# Patient Record
Sex: FEMALE | Race: WHITE | NOT HISPANIC OR LATINO | Employment: OTHER | URBAN - NONMETROPOLITAN AREA
[De-identification: names, ages, dates, MRNs, and addresses within clinical notes are randomized per-mention and may not be internally consistent; named-entity substitution may affect disease eponyms.]

---

## 2022-12-29 ENCOUNTER — HOSPITAL ENCOUNTER (OUTPATIENT)
Facility: HOSPITAL | Age: 77
Discharge: NURSING, REHAB OR LONG-TERM CARE | End: 2023-01-16
Attending: INTERNAL MEDICINE | Admitting: INTERNAL MEDICINE
Payer: COMMERCIAL

## 2022-12-29 LAB — GLUCOSE BLDC GLUCOMTR-MCNC: 175 MG/DL (ref 70–130)

## 2022-12-29 PROCEDURE — 63710000001 PREDNISONE PER 1 MG: Performed by: INTERNAL MEDICINE

## 2022-12-29 PROCEDURE — 82962 GLUCOSE BLOOD TEST: CPT

## 2022-12-29 PROCEDURE — 63710000001 INSULIN LISPRO (HUMAN) PER 5 UNITS: Performed by: INTERNAL MEDICINE

## 2022-12-29 RX ORDER — METOPROLOL SUCCINATE 25 MG/1
12.5 TABLET, EXTENDED RELEASE ORAL
Status: DISCONTINUED | OUTPATIENT
Start: 2022-12-30 | End: 2022-12-30

## 2022-12-29 RX ORDER — GUAIFENESIN 600 MG/1
1200 TABLET, EXTENDED RELEASE ORAL EVERY 12 HOURS SCHEDULED
Status: DISCONTINUED | OUTPATIENT
Start: 2022-12-29 | End: 2023-01-16 | Stop reason: HOSPADM

## 2022-12-29 RX ORDER — ATORVASTATIN CALCIUM 10 MG/1
20 TABLET, FILM COATED ORAL NIGHTLY
Status: DISCONTINUED | OUTPATIENT
Start: 2022-12-29 | End: 2023-01-16 | Stop reason: HOSPADM

## 2022-12-29 RX ORDER — NICOTINE POLACRILEX 4 MG
15 LOZENGE BUCCAL
Status: DISCONTINUED | OUTPATIENT
Start: 2022-12-29 | End: 2023-01-16 | Stop reason: HOSPADM

## 2022-12-29 RX ORDER — INSULIN LISPRO 100 [IU]/ML
2-7 INJECTION, SOLUTION INTRAVENOUS; SUBCUTANEOUS
Status: DISCONTINUED | OUTPATIENT
Start: 2022-12-29 | End: 2023-01-06

## 2022-12-29 RX ORDER — ONDANSETRON 2 MG/ML
4 INJECTION INTRAMUSCULAR; INTRAVENOUS EVERY 6 HOURS PRN
Status: DISCONTINUED | OUTPATIENT
Start: 2022-12-29 | End: 2023-01-16 | Stop reason: HOSPADM

## 2022-12-29 RX ORDER — ZOLPIDEM TARTRATE 5 MG/1
5 TABLET ORAL NIGHTLY
Status: DISCONTINUED | OUTPATIENT
Start: 2022-12-29 | End: 2023-01-04

## 2022-12-29 RX ORDER — SODIUM CHLORIDE 0.9 % (FLUSH) 0.9 %
10 SYRINGE (ML) INJECTION AS NEEDED
Status: DISCONTINUED | OUTPATIENT
Start: 2022-12-29 | End: 2023-01-16 | Stop reason: HOSPADM

## 2022-12-29 RX ORDER — LEVALBUTEROL INHALATION SOLUTION 1.25 MG/3ML
1.25 SOLUTION RESPIRATORY (INHALATION)
Status: DISCONTINUED | OUTPATIENT
Start: 2022-12-29 | End: 2022-12-30

## 2022-12-29 RX ORDER — LOSARTAN POTASSIUM 50 MG/1
100 TABLET ORAL
Status: DISCONTINUED | OUTPATIENT
Start: 2022-12-30 | End: 2023-01-16 | Stop reason: HOSPADM

## 2022-12-29 RX ORDER — ONDANSETRON 4 MG/1
4 TABLET, FILM COATED ORAL EVERY 6 HOURS PRN
Status: DISCONTINUED | OUTPATIENT
Start: 2022-12-29 | End: 2023-01-16 | Stop reason: HOSPADM

## 2022-12-29 RX ORDER — FLECAINIDE ACETATE 100 MG/1
100 TABLET ORAL EVERY 12 HOURS SCHEDULED
Status: DISCONTINUED | OUTPATIENT
Start: 2022-12-29 | End: 2023-01-16 | Stop reason: HOSPADM

## 2022-12-29 RX ORDER — DEXTROSE MONOHYDRATE 25 G/50ML
25 INJECTION, SOLUTION INTRAVENOUS
Status: DISCONTINUED | OUTPATIENT
Start: 2022-12-29 | End: 2023-01-16 | Stop reason: HOSPADM

## 2022-12-29 RX ORDER — PANTOPRAZOLE SODIUM 40 MG/1
40 TABLET, DELAYED RELEASE ORAL EVERY MORNING
Status: DISCONTINUED | OUTPATIENT
Start: 2022-12-30 | End: 2022-12-29

## 2022-12-29 RX ORDER — HYDRALAZINE HYDROCHLORIDE 25 MG/1
25 TABLET, FILM COATED ORAL EVERY 8 HOURS SCHEDULED
Status: DISCONTINUED | OUTPATIENT
Start: 2022-12-29 | End: 2023-01-09

## 2022-12-29 RX ORDER — ACETAMINOPHEN 650 MG/1
650 SUPPOSITORY RECTAL EVERY 4 HOURS PRN
Status: DISCONTINUED | OUTPATIENT
Start: 2022-12-29 | End: 2023-01-16 | Stop reason: HOSPADM

## 2022-12-29 RX ORDER — SODIUM CHLORIDE 0.9 % (FLUSH) 0.9 %
10 SYRINGE (ML) INJECTION EVERY 12 HOURS SCHEDULED
Status: DISCONTINUED | OUTPATIENT
Start: 2022-12-29 | End: 2023-01-16 | Stop reason: HOSPADM

## 2022-12-29 RX ORDER — ACETYLCYSTEINE 200 MG/ML
3 SOLUTION ORAL; RESPIRATORY (INHALATION)
Status: DISCONTINUED | OUTPATIENT
Start: 2022-12-29 | End: 2022-12-30

## 2022-12-29 RX ORDER — PREDNISONE 20 MG/1
20 TABLET ORAL 2 TIMES DAILY WITH MEALS
Status: DISCONTINUED | OUTPATIENT
Start: 2022-12-29 | End: 2022-12-31

## 2022-12-29 RX ORDER — MONTELUKAST SODIUM 10 MG/1
10 TABLET ORAL NIGHTLY
Status: DISCONTINUED | OUTPATIENT
Start: 2022-12-29 | End: 2023-01-16 | Stop reason: HOSPADM

## 2022-12-29 RX ORDER — ALPRAZOLAM 0.5 MG/1
0.5 TABLET ORAL 2 TIMES DAILY PRN
Status: DISPENSED | OUTPATIENT
Start: 2022-12-29 | End: 2023-01-05

## 2022-12-29 RX ORDER — TORSEMIDE 20 MG/1
20 TABLET ORAL DAILY
Status: DISCONTINUED | OUTPATIENT
Start: 2022-12-30 | End: 2023-01-16 | Stop reason: HOSPADM

## 2022-12-29 RX ORDER — PANTOPRAZOLE SODIUM 40 MG/1
40 TABLET, DELAYED RELEASE ORAL NIGHTLY
Status: DISCONTINUED | OUTPATIENT
Start: 2022-12-29 | End: 2022-12-30

## 2022-12-29 RX ORDER — ACETAMINOPHEN 325 MG/1
650 TABLET ORAL EVERY 4 HOURS PRN
Status: DISCONTINUED | OUTPATIENT
Start: 2022-12-29 | End: 2023-01-16 | Stop reason: HOSPADM

## 2022-12-29 RX ORDER — SCOLOPAMINE TRANSDERMAL SYSTEM 1 MG/1
1 PATCH, EXTENDED RELEASE TRANSDERMAL
Status: DISCONTINUED | OUTPATIENT
Start: 2022-12-31 | End: 2023-01-02

## 2022-12-29 RX ORDER — DOCUSATE SODIUM 100 MG/1
100 CAPSULE, LIQUID FILLED ORAL 2 TIMES DAILY PRN
Status: DISCONTINUED | OUTPATIENT
Start: 2022-12-29 | End: 2023-01-16 | Stop reason: HOSPADM

## 2022-12-29 RX ORDER — POTASSIUM CHLORIDE 750 MG/1
20 CAPSULE, EXTENDED RELEASE ORAL DAILY
Status: DISCONTINUED | OUTPATIENT
Start: 2022-12-30 | End: 2023-01-16 | Stop reason: HOSPADM

## 2022-12-29 NOTE — PROGRESS NOTES
"LTACH Fall Assessment Note    Keisha Resendiz is a 77 y.o.female  [Ht: 162.6 cm (64\"); Wt: 98 kg (216 lb 0.8 oz)] admitted 12/29/2022  3:21 PM.    Current medications associated with an increased risk for fall include:  - Alprazolam  - Flecainide  - Hydralazine  - Tussionex  - Insulin lispro  - Losartan  - Metoprolol   - Scopolamine  - Torsemide   - Zolpidem    Tushar Carrizales, PharmD  12/29/2217:08 CST       "

## 2022-12-30 LAB
ALBUMIN SERPL-MCNC: 3.2 G/DL (ref 3.5–5.2)
ALBUMIN/GLOB SERPL: 1.1 G/DL
ALP SERPL-CCNC: 100 U/L (ref 39–117)
ALT SERPL W P-5'-P-CCNC: 22 U/L (ref 1–33)
ANION GAP SERPL CALCULATED.3IONS-SCNC: 10 MMOL/L (ref 5–15)
AST SERPL-CCNC: 32 U/L (ref 1–32)
BASOPHILS # BLD AUTO: 0.05 10*3/MM3 (ref 0–0.2)
BASOPHILS NFR BLD AUTO: 0.4 % (ref 0–1.5)
BILIRUB SERPL-MCNC: 1 MG/DL (ref 0–1.2)
BUN SERPL-MCNC: 43 MG/DL (ref 8–23)
BUN/CREAT SERPL: 69.4 (ref 7–25)
CALCIUM SPEC-SCNC: 9.1 MG/DL (ref 8.6–10.5)
CHLORIDE SERPL-SCNC: 96 MMOL/L (ref 98–107)
CO2 SERPL-SCNC: 34 MMOL/L (ref 22–29)
CREAT SERPL-MCNC: 0.62 MG/DL (ref 0.57–1)
DEPRECATED RDW RBC AUTO: 53.7 FL (ref 37–54)
EGFRCR SERPLBLD CKD-EPI 2021: 91.9 ML/MIN/1.73
EOSINOPHIL # BLD AUTO: 0.02 10*3/MM3 (ref 0–0.4)
EOSINOPHIL NFR BLD AUTO: 0.2 % (ref 0.3–6.2)
ERYTHROCYTE [DISTWIDTH] IN BLOOD BY AUTOMATED COUNT: 16.2 % (ref 12.3–15.4)
GLOBULIN UR ELPH-MCNC: 3 GM/DL
GLUCOSE BLDC GLUCOMTR-MCNC: 165 MG/DL (ref 70–130)
GLUCOSE BLDC GLUCOMTR-MCNC: 175 MG/DL (ref 70–130)
GLUCOSE BLDC GLUCOMTR-MCNC: 184 MG/DL (ref 70–130)
GLUCOSE BLDC GLUCOMTR-MCNC: 82 MG/DL (ref 70–130)
GLUCOSE SERPL-MCNC: 130 MG/DL (ref 65–99)
HCT VFR BLD AUTO: 47.7 % (ref 34–46.6)
HGB BLD-MCNC: 14.8 G/DL (ref 12–15.9)
IMM GRANULOCYTES # BLD AUTO: 0.17 10*3/MM3 (ref 0–0.05)
IMM GRANULOCYTES NFR BLD AUTO: 1.4 % (ref 0–0.5)
LYMPHOCYTES # BLD AUTO: 1.88 10*3/MM3 (ref 0.7–3.1)
LYMPHOCYTES NFR BLD AUTO: 15 % (ref 19.6–45.3)
MCH RBC QN AUTO: 27.9 PG (ref 26.6–33)
MCHC RBC AUTO-ENTMCNC: 31 G/DL (ref 31.5–35.7)
MCV RBC AUTO: 89.8 FL (ref 79–97)
MONOCYTES # BLD AUTO: 0.87 10*3/MM3 (ref 0.1–0.9)
MONOCYTES NFR BLD AUTO: 6.9 % (ref 5–12)
NEUTROPHILS NFR BLD AUTO: 76.1 % (ref 42.7–76)
NEUTROPHILS NFR BLD AUTO: 9.54 10*3/MM3 (ref 1.7–7)
NRBC BLD AUTO-RTO: 0 /100 WBC (ref 0–0.2)
PLATELET # BLD AUTO: 245 10*3/MM3 (ref 140–450)
PMV BLD AUTO: 10.6 FL (ref 6–12)
POTASSIUM SERPL-SCNC: 4 MMOL/L (ref 3.5–5.2)
PREALB SERPL-MCNC: 31.9 MG/DL (ref 20–40)
PROT SERPL-MCNC: 6.2 G/DL (ref 6–8.5)
RBC # BLD AUTO: 5.31 10*6/MM3 (ref 3.77–5.28)
SODIUM SERPL-SCNC: 140 MMOL/L (ref 136–145)
WBC NRBC COR # BLD: 12.53 10*3/MM3 (ref 3.4–10.8)

## 2022-12-30 PROCEDURE — 97166 OT EVAL MOD COMPLEX 45 MIN: CPT

## 2022-12-30 PROCEDURE — 97162 PT EVAL MOD COMPLEX 30 MIN: CPT | Performed by: PHYSICAL THERAPIST

## 2022-12-30 PROCEDURE — 92610 EVALUATE SWALLOWING FUNCTION: CPT

## 2022-12-30 PROCEDURE — 82962 GLUCOSE BLOOD TEST: CPT

## 2022-12-30 PROCEDURE — 63710000001 INSULIN LISPRO (HUMAN) PER 5 UNITS: Performed by: INTERNAL MEDICINE

## 2022-12-30 PROCEDURE — 80053 COMPREHEN METABOLIC PANEL: CPT | Performed by: INTERNAL MEDICINE

## 2022-12-30 PROCEDURE — 85025 COMPLETE CBC W/AUTO DIFF WBC: CPT | Performed by: INTERNAL MEDICINE

## 2022-12-30 PROCEDURE — 99222 1ST HOSP IP/OBS MODERATE 55: CPT | Performed by: INTERNAL MEDICINE

## 2022-12-30 PROCEDURE — 63710000001 PREDNISONE PER 1 MG: Performed by: INTERNAL MEDICINE

## 2022-12-30 PROCEDURE — 84134 ASSAY OF PREALBUMIN: CPT | Performed by: INTERNAL MEDICINE

## 2022-12-30 RX ORDER — METOPROLOL SUCCINATE 25 MG/1
25 TABLET, EXTENDED RELEASE ORAL
Status: DISCONTINUED | OUTPATIENT
Start: 2022-12-31 | End: 2023-01-16 | Stop reason: HOSPADM

## 2022-12-30 RX ORDER — NYSTATIN 100000 [USP'U]/G
POWDER TOPICAL EVERY 12 HOURS SCHEDULED
Status: DISCONTINUED | OUTPATIENT
Start: 2022-12-30 | End: 2023-01-16 | Stop reason: HOSPADM

## 2022-12-30 RX ORDER — LEVALBUTEROL INHALATION SOLUTION 1.25 MG/3ML
1.25 SOLUTION RESPIRATORY (INHALATION)
Status: DISCONTINUED | OUTPATIENT
Start: 2022-12-30 | End: 2022-12-30 | Stop reason: SDUPTHER

## 2022-12-30 RX ORDER — NYSTATIN 100000 [USP'U]/G
POWDER TOPICAL 2 TIMES DAILY PRN
Status: DISCONTINUED | OUTPATIENT
Start: 2022-12-30 | End: 2023-01-05

## 2022-12-30 RX ORDER — NYSTATIN 100000 [USP'U]/G
POWDER TOPICAL EVERY 8 HOURS SCHEDULED
Status: DISCONTINUED | OUTPATIENT
Start: 2022-12-30 | End: 2023-01-05 | Stop reason: SDUPTHER

## 2022-12-30 RX ORDER — PANTOPRAZOLE SODIUM 40 MG/1
40 TABLET, DELAYED RELEASE ORAL
Status: DISCONTINUED | OUTPATIENT
Start: 2022-12-30 | End: 2023-01-16 | Stop reason: HOSPADM

## 2022-12-30 RX ORDER — LEVALBUTEROL INHALATION SOLUTION 1.25 MG/3ML
1.25 SOLUTION RESPIRATORY (INHALATION)
Status: DISCONTINUED | OUTPATIENT
Start: 2022-12-30 | End: 2022-12-31

## 2022-12-30 RX ORDER — METOPROLOL TARTRATE 5 MG/5ML
5 INJECTION INTRAVENOUS EVERY 4 HOURS PRN
Status: DISCONTINUED | OUTPATIENT
Start: 2022-12-30 | End: 2023-01-16 | Stop reason: HOSPADM

## 2022-12-30 NOTE — CONSULTS
PULMONARY AND CRITICAL CARE CONSULT - [facility]    Keisha Resendiz   MR# [id number]  Acct# [id number]  12/30/2022   09:35 CST    Referring Provider: Jose Alfredo Mcbride MD    Chief Complaint: acute on chronic respiratory failure     HPI: We are consulted by Jose Alfredo Mcbride MD to see this 77 y.o. female born on 1945.  Patient complains of dyspnea in the chest for 3 weeks. Severity: severe.  Aggravating factors: walking.   Alleviating factors: medication(s) (albuterol) Associated symptoms: fatigue. Sputum is clear.  Patient currently is on oxygen at 4 L/min per nasal cannula.. Respiratory history: COPD  Ms. Resendiz transferred from [facility] after being treated for CHF and pneumonia. BNP 2500 on admission. She has a history of copd, severe tracheomalacia, pulmonary fibrosis and pulmonary hypertension.  She underwent bronchoscopy while at outlying facility with removal of mucus plugging. BAL grew some type of candida and she completed Eraxis. She completed a 7 day course of cefepime. She is seen awake and alert sitting up in bed. Oxygen saturation stable on 5l nasal cannula. She complains of severe fatigue. Afebrile.     Past Medical History   has no past medical history on file.   has no past surgical history on file.  Allergies   Allergen Reactions   • Clindamycin/Lincomycin Unknown - High Severity   • Keflex [Cephalexin] Unknown - High Severity   • Penicillins Unknown - High Severity   • Sulfa Antibiotics Unknown - High Severity     Medications  apixaban, 5 mg, Oral, Q12H  atorvastatin, 20 mg, Oral, Nightly  flecainide, 100 mg, Oral, Q12H  guaiFENesin, 1,200 mg, Oral, Q12H  hydrALAZINE, 25 mg, Oral, Q8H  insulin lispro, 2-7 Units, Subcutaneous, 4x Daily AC & at Bedtime  ipratropium, 0.5 mg, Nebulization, TID - RT  levalbuterol, 1.25 mg, Nebulization, TID - RT  losartan, 100 mg, Oral, Q24H  metoprolol succinate XL, 12.5 mg, Oral, Q24H  montelukast, 10 mg, Oral,  Nightly  nystatin, , Topical, Q8H  pantoprazole, 40 mg, Oral, BID AC  potassium chloride, 20 mEq, Oral, Daily  predniSONE, 20 mg, Oral, BID With Meals  [START ON 12/31/2022] Scopolamine, 1 patch, Transdermal, Q72H  sodium chloride, 10 mL, Intravenous, Q12H  torsemide, 20 mg, Oral, Daily  zolpidem, 5 mg, Oral, Nightly         Social History     Family History  family history is not on file.  Review of Systems:  Review of Systems   Constitutional: Positive for fatigue. Negative for fever.   HENT: Negative for congestion.    Respiratory: Positive for cough and shortness of breath.    Cardiovascular: Negative for chest pain.   Gastrointestinal: Negative for diarrhea and nausea.   Genitourinary: Negative for difficulty urinating.   Musculoskeletal: Positive for arthralgias.   Skin: Negative for rash and wound.   Neurological: Positive for weakness.   Psychiatric/Behavioral: The patient is not nervous/anxious.      Physical Exam:  Vital signs: T:97.6   BP:126/62   P:77   R:16   Sat:93  Physical Exam  Constitutional:       General: She is not in acute distress.     Interventions: Nasal cannula in place.   HENT:      Head: Normocephalic.      Nose: Nose normal.      Mouth/Throat:      Mouth: Mucous membranes are moist.   Eyes:      General: No scleral icterus.  Cardiovascular:      Rate and Rhythm: Normal rate.   Pulmonary:      Effort: No respiratory distress.      Breath sounds: Decreased breath sounds present.   Abdominal:      General: There is no distension.   Genitourinary:     Comments: May   Musculoskeletal:      Right lower leg: Edema present.      Left lower leg: Edema present.   Skin:     Findings: Bruising present. No erythema.      Comments: Chronic venous stasis changes to ble    Neurological:      Mental Status: She is alert and oriented to person, place, and time.   Psychiatric:         Mood and Affect: Mood normal.         Behavior: Behavior is cooperative.         Electronically signed by Anastasia Doe  APRN, 2022, 09:49 CST      Physician Substantive Portion: Medical Decision Making    Results from last 7 days   Lab Units 22  0408   WBC 10*3/mm3 12.53*   HEMOGLOBIN g/dL 14.8   PLATELETS 10*3/mm3 245     Results from last 7 days   Lab Units 22  0408   SODIUM mmol/L 140   POTASSIUM mmol/L 4.0   CO2 mmol/L 34.0*   BUN mg/dL 43*   CREATININE mg/dL 0.62   GLUCOSE mg/dL 130*         No results found for: PROBNP  No results found for: BLOODCX, URINECX, WOUNDCX, MRSACX, RESPCX, STOOLCX    Recent radiology:   Imaging Results (Last 72 Hours)     ** No results found for the last 72 hours. **      cxr 10/2022 outside  Frontal chest. Heart appears mildly enlarged. Peripheral reticular   opacities are seen which may be related to progression of interstitial   changes and scarring although atypical pneumonia would be in the   differential. Left basilar opacities favoring atelectasis or scarring.   Otherwise no significant consolidation seen. No sizable effusion and   no pneumothorax seen.  Exam End: 10/19/22 21:23       My radiograph interpretation/independent review of imaging: images not available  Other test results (not lab or imaging):  none available  Independent review of ekg: telem: sr  Problem List as identified by Epic (may contain historical, inactive problems)  There is no problem list on file for this patient.    Pulmonary Assessment:  New problem (to me), with additional workup planned: copd exacerbation with acute hypoxic resp failure  New problem (to me), no additional workup planned: CHF, treated  Other problems either stable, failing to improve or worsenin. Recent candida in resp tract    Recommend/plan:   · Check cxr when available here  · Continue steroids, will begin tapering tomorrow if better  · Aerosol bronchodilators  · PT, mobilization  · Continue oxygen for sat 90, may need home oxygen  · Consider stopping scopolamine patch    This visit was performed by both a physician and an  Advanced Practice RN.  I personally evaluated and examined the patient.  I performed all aspects of the medical decision making as documented.  Electronically signed by Alejandro Batista MD, 12/30/2022, 11:17 CST

## 2022-12-30 NOTE — H&P
Reed Mcbride M.D.  KD Guillen          Internal Medicine History and Physical      Name: Keisha Resendiz  MRN: [id number]     Acct: [id number]  Room: 5/    Admit Date: 12/29/2022  PCP: Shannon Lilly PA-C    Chief Complaint:     Oxygen weaning and rehabilitation efforts    History Obtained From:     Patient and chart review    History of Present Illness:      Keisha Resendiz is a  77 y.o.  female who presents to our facility with a past medical history of Heart failure, depression, COPD with home O2, HTN, Hyperlipidemia, bronchiectasis, chronic interstitial lung disease, pulmonary hypertension, severe tracheomalacia and DVT with IVC filter placement. She was in her usual state of health when she became short of breath at home, she checked her pulse ox and her sat was in the low 60's. Home health arrived to recheck and advised she go to the ED. Upon arrival at the ED she mentioned she had been short of breath for a few days, had a productive cough, fever and weakness. She was placed on a venti mask in the ED and admitted to the ICU due to concerns she might need BiPAP. While cultures were pending she was treated empirically with cefepime and vancomycin. She was also diuresed with lasix and given typical pulmonary toilet ing. Vancomycin was later changed to Zyvox orally to decrease some fluid intake. CT of her chest showed numerous small PE's in which lovenox was started for.  Due to a positive MRSA nasal swab and history of MRSA pneumonia last September she was placed on linezolid. After the sensitivities were back she was changed to bactrim and tetracycline. A bronchoscopy was performed, large amounts of mucous plugging were removed. Airway inspection showed a large venous lake in the anterior segment of the left upper lobe but no other lesions. The bronchial wash grew Candida and the patient was placed on Erais secondary to interaction of fluconazole with patients flecainide.  All antibiotics were de-escalated and completed by 12/25/22.  Due to her continued need for oxygen weaning and rehabilitation efforts she was transferred to our facility.      Past Medical History:     No past medical history on file.     Past Surgical History:     No past surgical history on file.     Medications Prior to Admission:       Prior to Admission medications    Not on File        Allergies:       Clindamycin/lincomycin, Keflex [cephalexin], Penicillins, and Sulfa antibiotics    Social History:     Tobacco:    has no history on file for tobacco use.  Alcohol:      has no history on file for alcohol use.  Drug Use:  has no history on file for drug use.    Family History:     No family history on file.    Review of Systems:     Review of Systems   Constitutional: Positive for malaise/fatigue. Negative for chills, decreased appetite and fever.   HENT: Negative for congestion, ear pain, nosebleeds and sore throat.    Eyes: Negative for blurred vision, double vision and pain.   Cardiovascular: Positive for dyspnea on exertion and irregular heartbeat. Negative for chest pain, claudication and leg swelling.   Respiratory: Positive for shortness of breath. Negative for cough and hemoptysis.    Endocrine: Negative for cold intolerance, heat intolerance, polydipsia, polyphagia and polyuria.   Hematologic/Lymphatic: Negative for adenopathy and bleeding problem.   Skin: Negative for flushing, itching, nail changes and rash.   Musculoskeletal: Positive for falls and muscle weakness. Negative for arthritis and back pain.   Gastrointestinal: Negative for bloating, diarrhea, nausea and vomiting.   Genitourinary: Negative for dysuria, flank pain and hematuria.   Neurological: Positive for weakness. Negative for difficulty with concentration and excessive daytime sleepiness.   Psychiatric/Behavioral: Negative for altered mental status, depression, hallucinations, suicidal ideas and thoughts of violence.  "  Allergic/Immunologic: Negative for environmental allergies, HIV exposure, hives and persistent infections.       Code Status:    There are no questions and answers to display.       Physical Exam:     Vitals:  Ht 162.6 cm (64\")   Wt 98 kg (216 lb 0.8 oz)   BMI 37.09 kg/m²   T 97.6 HR 77 RR 16 /63 SPO2 93% on NC 6L   Physical Exam  Vitals and nursing note reviewed.   Constitutional:       Appearance: Normal appearance. She is obese.   HENT:      Head: Normocephalic and atraumatic.      Right Ear: External ear normal.      Left Ear: External ear normal.      Nose: Nose normal.      Mouth/Throat:      Mouth: Mucous membranes are moist.      Pharynx: Oropharynx is clear.   Eyes:      Extraocular Movements: Extraocular movements intact.      Conjunctiva/sclera: Conjunctivae normal.      Pupils: Pupils are equal, round, and reactive to light.   Cardiovascular:      Rate and Rhythm: Tachycardia present. Rhythm irregular.      Pulses: Normal pulses.      Heart sounds: Normal heart sounds.   Pulmonary:      Effort: Pulmonary effort is normal.      Breath sounds: Wheezing present.   Abdominal:      General: Bowel sounds are normal.      Palpations: Abdomen is soft.   Musculoskeletal:         General: Normal range of motion.      Cervical back: Normal range of motion.      Right lower leg: No edema.      Left lower leg: No edema.   Skin:     General: Skin is warm and dry.      Capillary Refill: Capillary refill takes 2 to 3 seconds.   Neurological:      General: No focal deficit present.      Mental Status: She is alert and oriented to person, place, and time. Mental status is at baseline.   Psychiatric:         Mood and Affect: Mood normal.         Behavior: Behavior normal.         Thought Content: Thought content normal.         Judgment: Judgment normal.               Data:     Lab Results (last 7 days)       Procedure Component Value Units Date/Time    Prealbumin [[id number]]  (Normal) Collected: 12/30/22 0408    " Specimen: Blood Updated: 12/30/22 1230     Prealbumin 31.9 mg/dL     POC Glucose Once [[id number]]  (Abnormal) Collected: 12/30/22 1108    Specimen: Blood Updated: 12/30/22 1131     Glucose 175 mg/dL      Comment: : sean Al ([facility]) CindyMeter ID: [id number]       POC Glucose Once [[id number]]  (Normal) Collected: 12/30/22 0715    Specimen: Blood Updated: 12/30/22 0727     Glucose 82 mg/dL      Comment: : jordan Rodriguez BrittanyMeter ID: [id number]       Comprehensive Metabolic Panel [[id number]]  (Abnormal) Collected: 12/30/22 0408    Specimen: Blood Updated: 12/30/22 0453     Glucose 130 mg/dL      BUN 43 mg/dL      Creatinine 0.62 mg/dL      Sodium 140 mmol/L      Potassium 4.0 mmol/L      Chloride 96 mmol/L      CO2 34.0 mmol/L      Calcium 9.1 mg/dL      Total Protein 6.2 g/dL      Albumin 3.2 g/dL      ALT (SGPT) 22 U/L      AST (SGOT) 32 U/L      Alkaline Phosphatase 100 U/L      Total Bilirubin 1.0 mg/dL      Globulin 3.0 gm/dL      A/G Ratio 1.1 g/dL      BUN/Creatinine Ratio 69.4     Anion Gap 10.0 mmol/L      eGFR 91.9 mL/min/1.73      Comment: National Kidney Foundation and American Society of Nephrology (ASN) Task Force recommended calculation based on the Chronic Kidney Disease Epidemiology Collaboration (CKD-EPI) equation refit without adjustment for race.       Narrative:      GFR Normal >60  Chronic Kidney Disease <60  Kidney Failure <15    The GFR formula is only valid for adults with stable renal function between ages 18 and 70.    CBC & Differential [[id number]]  (Abnormal) Collected: 12/30/22 0408    Specimen: Blood Updated: 12/30/22 0427    Narrative:      The following orders were created for panel order CBC & Differential.  Procedure                               Abnormality         Status                     ---------                               -----------         ------                     CBC Auto Differential[[id number]]        Abnormal            Final result                  Please view results for these tests on the individual orders.    CBC Auto Differential [[id number]]  (Abnormal) Collected: 12/30/22 0408    Specimen: Blood Updated: 12/30/22 0427     WBC 12.53 10*3/mm3      RBC 5.31 10*6/mm3      Hemoglobin 14.8 g/dL      Hematocrit 47.7 %      MCV 89.8 fL      MCH 27.9 pg      MCHC 31.0 g/dL      RDW 16.2 %      RDW-SD 53.7 fl      MPV 10.6 fL      Platelets 245 10*3/mm3      Neutrophil % 76.1 %      Lymphocyte % 15.0 %      Monocyte % 6.9 %      Eosinophil % 0.2 %      Basophil % 0.4 %      Immature Grans % 1.4 %      Neutrophils, Absolute 9.54 10*3/mm3      Lymphocytes, Absolute 1.88 10*3/mm3      Monocytes, Absolute 0.87 10*3/mm3      Eosinophils, Absolute 0.02 10*3/mm3      Basophils, Absolute 0.05 10*3/mm3      Immature Grans, Absolute 0.17 10*3/mm3      nRBC 0.0 /100 WBC     POC Glucose Once [[id number]]  (Abnormal) Collected: 12/29/22 1630    Specimen: Blood Updated: 12/29/22 1641     Glucose 175 mg/dL      Comment: : ashish Sumner JanieMeter ID: [id number]             No results found.      Assessment:     Primary Problem  <principal problem not specified>        * No active hospital problems. *    No past medical history on file.    Plan:     Acute hypoxemic respiratory failure  Pulmonary Emboli  Heart failure with preserved ejection fraction  Pulmonary fibrosis  Severe tracheomalacia  Afib with RVR  Hypertension  Acute exacerbation of COPD  Hyperglycemia    Continue current treatments. Oxygen weaning. Consult PT/OT/SLP for evaluation and treatment. Maintain patient safety. Glycemic control. Labs on Monday. Cardizem drip.       Electronically signed by KD Grey on 12/30/2022 at 12:43 CST     Copy sent to Dr. Lilly, Shannon Guillen PA-C  I have discussed the care of Keisha Resendiz, including pertinent history and exam findings, with the nurse practitioner.    I have seen and examined the patient and the key elements of all parts of the  encounter have been performed by me.  I agree with the assessment, plan and orders as documented by KD Grajeda, after I modified the exam findings and the plan of treatments and the final version is my approved version of the assessment.        Electronically signed by Jose Alfredo Mcbride MD on 1/4/2023 at 10:57 CST

## 2022-12-30 NOTE — CONSULTS
Adult Nutrition  Assessment/PES    Patient Name:  Keisha Resendiz  YOB: 1945  MRN: [id number]  Admit Date:  12/29/2022    Assessment Date:  12/30/2022   Reason for Assessment     Row Name 12/30/22 1050          Reason for Assessment    Reason For Assessment per organizational policy;other (see comments)  LTACH admission     Diagnosis cardiac disease;pulmonary disease                Nutrition/Diet History     Row Name 12/30/22 1051          Nutrition/Diet History    Typical Intake (Food/Fluid/EN/PN) Working with therapy. Menu provided. Admit wt 245lb; 111kg per admit paperwork. Last BM 12/29 per admission face sheet. Venous stasis BLEs and left side abdomen fold with nystatin treatment.                Labs/Tests/Procedures/Meds     Row Name 12/30/22 1052          Labs/Procedures/Meds    Lab Results Reviewed reviewed        Diagnostic Tests/Procedures    Diagnostic Test/Procedure Reviewed reviewed        Medications    Pertinent Medications Reviewed reviewed     Pertinent Medications Comments See MAR                Physical Findings     Row Name 12/30/22 1052          Physical Findings    Overall Physical Appearance BLE edema, BLE venous stasis, left abd. fold redness, BM 12/29, NC.                Estimated/Assessed Needs - Anthropometrics     Row Name 12/30/22 1052          Anthropometrics    Weight for Calculation 111 kg (244 lb 11.4 oz)        Estimated/Assessed Needs    Additional Documentation Fluid Requirements (Group);KCAL/KG (Group);Protein Requirements (Group)        KCAL/KG    KCAL/KG 14 Kcal/Kg (kcal)     14 Kcal/Kg (kcal) 1554        Protein Requirements    Weight Used For Protein Calculations 54.4 kg (120 lb)  IBW     Est Protein Requirement Amount (gms/kg) 1.4 gm protein     Estimated Protein Requirements (gms/day) 76.2        Fluid Requirements    Fluid Requirements (mL/day) 1633     Estimated Fluid Requirement Method other (see comments)  30mL/kg IBW     RDA Method (mL) 8368                 Nutrition Prescription Ordered     Row Name 12/30/22 1053          Nutrition Prescription PO    Current PO Diet Regular     Fluid Consistency Thin                Evaluation of Received Nutrient/Fluid Intake     Row Name 12/30/22 1053          Nutrient/Fluid Evaluation    Number of Days Evaluated Other (comment)  insufficient data; admission < 24 hours        Fluid Intake Evaluation    Oral Fluid (mL) --  insufficient data; admission < 24 hours        PO Evaluation    Number of Days PO Intake Evaluated Insufficient Data     % PO Intake insufficient data; admission < 24 hours                   Problem/Interventions:   Problem 1     Row Name 12/30/22 1053          Nutrition Diagnoses Problem 1    Problem 1 Nutrition Appropriate for Condition at this Time                      Intervention Goal     Row Name 12/30/22 1053          Intervention Goal    General Disease management/therapy;Meet nutritional needs for age/condition     PO Meet estimated needs;Tolerate PO     Weight Appropriate weight loss                Nutrition Intervention     Row Name 12/30/22 1053          Nutrition Intervention    RD/Tech Action Follow Tx progress;Care plan reviewd;Menu provided                Nutrition Prescription     Row Name 12/30/22 1053          Nutrition Prescription PO    PO Prescription Other (comment)  continue same protocol                Education/Evaluation     Row Name 12/30/22 1053          Education    Education No discharge needs identified at this time        Monitor/Evaluation    Monitor Per protocol                 Electronically signed by:  Juana Giles RD  12/30/22 10:54 CST

## 2022-12-31 LAB
GLUCOSE BLDC GLUCOMTR-MCNC: 110 MG/DL (ref 70–130)
GLUCOSE BLDC GLUCOMTR-MCNC: 172 MG/DL (ref 70–130)
GLUCOSE BLDC GLUCOMTR-MCNC: 99 MG/DL (ref 70–130)

## 2022-12-31 PROCEDURE — 82962 GLUCOSE BLOOD TEST: CPT

## 2022-12-31 PROCEDURE — 97110 THERAPEUTIC EXERCISES: CPT

## 2022-12-31 PROCEDURE — 97530 THERAPEUTIC ACTIVITIES: CPT

## 2022-12-31 PROCEDURE — 99232 SBSQ HOSP IP/OBS MODERATE 35: CPT | Performed by: INTERNAL MEDICINE

## 2022-12-31 PROCEDURE — 63710000001 PREDNISONE PER 1 MG: Performed by: NURSE PRACTITIONER

## 2022-12-31 PROCEDURE — 63710000001 LEVALBUTEROL PER 0.5 MG: Performed by: NURSE PRACTITIONER

## 2022-12-31 PROCEDURE — 63710000001 INSULIN LISPRO (HUMAN) PER 5 UNITS: Performed by: INTERNAL MEDICINE

## 2022-12-31 RX ORDER — LEVALBUTEROL INHALATION SOLUTION 0.63 MG/3ML
0.63 SOLUTION RESPIRATORY (INHALATION)
Status: DISCONTINUED | OUTPATIENT
Start: 2022-12-31 | End: 2023-01-03

## 2022-12-31 RX ORDER — PREDNISONE 20 MG/1
20 TABLET ORAL DAILY
Status: DISCONTINUED | OUTPATIENT
Start: 2022-12-31 | End: 2023-01-03

## 2022-12-31 RX ORDER — LEVALBUTEROL INHALATION SOLUTION 0.63 MG/3ML
0.63 SOLUTION RESPIRATORY (INHALATION)
Status: DISCONTINUED | OUTPATIENT
Start: 2022-12-31 | End: 2022-12-31 | Stop reason: SDUPTHER

## 2022-12-31 RX ORDER — PREDNISONE 20 MG/1
20 TABLET ORAL DAILY
Status: DISCONTINUED | OUTPATIENT
Start: 2023-01-01 | End: 2022-12-31

## 2022-12-31 NOTE — PROGRESS NOTES
PULMONARY AND CRITICAL CARE PROGRESS NOTE - [facility]    Patient: Keisha Resendiz    1945   Acct# [id number]  12/31/22   10:50 CST  Referring Provider: Jose Alfredo Mcbride MD  Chief Complaint: acute on chronic respiratory failure   Interval history: She is seen awake and alert resting in bed. She is feeling better today. Oxygen saturation stable on 3l nasal cannula. She reports her heart rate was elevated some overnight. Afebrile. No other issues reported overnight.   Review of Systems: Review of Systems   Constitutional: Positive for fever.   Respiratory: Positive for cough and shortness of breath.    Cardiovascular: Negative for chest pain.   Gastrointestinal: Negative for diarrhea and nausea.   Musculoskeletal: Negative for arthralgias.   Neurological: Positive for weakness.      Physical Exam:  Vital signs: T:97.8   BP:122/57   P:78   R:30   Sat:94  Physical Exam  Constitutional:       General: She is not in acute distress.     Appearance: She is obese.      Interventions: Nasal cannula in place.   HENT:      Head: Normocephalic.      Nose: Nose normal.      Mouth/Throat:      Mouth: Mucous membranes are moist.   Eyes:      General: No scleral icterus.  Cardiovascular:      Rate and Rhythm: Normal rate.   Pulmonary:      Effort: No respiratory distress.      Breath sounds: Rales present.   Abdominal:      General: There is no distension.   Genitourinary:     Comments: penn  Musculoskeletal:      Right lower leg: Edema present.      Left lower leg: Edema present.   Skin:     Findings: Bruising present.   Neurological:      Mental Status: She is alert and oriented to person, place, and time.   Psychiatric:         Mood and Affect: Mood normal.         Behavior: Behavior is cooperative.         Electronically signed by KD Gunter, 12/31/2022, 10:50 CST      Physician substantive portion: medical decision making    Laboratory Data:  Results from last 7 days   Lab Units  12/30/22  0408   WBC 10*3/mm3 12.53*   HEMOGLOBIN g/dL 14.8   PLATELETS 10*3/mm3 245     Results from last 7 days   Lab Units 12/30/22  0408   SODIUM mmol/L 140   POTASSIUM mmol/L 4.0   CO2 mmol/L 34.0*   BUN mg/dL 43*   CREATININE mg/dL 0.62         Pulmonary Assessment:    1. Acute resp failure with hypoxia  2. Copd  3. Tracheomalacia  4. Pulmonary fibrosis  5. Pulmonary hypertension  6. Weakness and deconditioning; pt reports nonambulatory currently    Recommend/plan:   · Continue RT  · Continue oxygen and titrate for sat 90  · PT mobilization      This visit was performed by both a physician and an Advanced Practice RN.  I personally evaluated and examined the patient.  I performed all aspects of the medical decision making as documented.  Electronically signed by Alejandro Batista MD, 12/31/2022, 13:12 CST

## 2022-12-31 NOTE — PROGRESS NOTES
[facility]  FABBY Sung APRN        Internal Medicine Progress Note    12/31/2022   09:12 CST    Name:  Keisha Resendiz  MRN:    [id number]     Acct:     [id number]   Room:  [address] [place] [address] Day: 0     Admit Date: 12/29/2022  3:21 PM  PCP: Shannon Lilly PA-C    Subjective:     C/C: Oxygen weaning and rehabilitation efforts    Interval History: Status: Improved. Pt resting in bed, no family at bedside. Pt tolerating O2 @6L. Complains of weakness.  Off Cardizem drip, HR in 70's. No new concerns this am.     Review of Systems   Constitutional: Negative for chills, decreased appetite and fever.   HENT: Negative for congestion, ear pain and sore throat.    Eyes: Negative for blurred vision, discharge and pain.   Cardiovascular: Positive for dyspnea on exertion and irregular heartbeat. Negative for chest pain and claudication.   Respiratory: Positive for shortness of breath. Negative for cough.    Endocrine: Negative for cold intolerance, heat intolerance, polydipsia, polyphagia and polyuria.   Skin: Negative for flushing, itching, nail changes and rash.   Musculoskeletal: Positive for falls and muscle weakness. Negative for arthritis and back pain.   Gastrointestinal: Negative for bloating, abdominal pain and diarrhea.   Genitourinary: Negative for bladder incontinence, dysuria and flank pain.   Neurological: Positive for weakness. Negative for difficulty with concentration, disturbances in coordination and headaches.   Psychiatric/Behavioral: Positive for altered mental status. Negative for depression, substance abuse, suicidal ideas and thoughts of violence.   Allergic/Immunologic: Negative for environmental allergies, HIV exposure, hives and persistent infections.         Medications:     Allergies:   Allergies   Allergen Reactions   • Clindamycin/Lincomycin Unknown - High Severity   • Keflex [Cephalexin] Unknown - High Severity   • Penicillins Unknown - High Severity   • Sulfa  Antibiotics Unknown - High Severity       Current Meds:   Current Facility-Administered Medications:   •  acetaminophen (TYLENOL) tablet 650 mg, 650 mg, Oral, Q4H PRN **OR** acetaminophen (TYLENOL) suppository 650 mg, 650 mg, Rectal, Q4H PRN, Jose Alfredo Mcbride MD  •  ALPRAZolam (XANAX) tablet 0.5 mg, 0.5 mg, Oral, BID PRN, Jose Alfredo Mcbride MD  •  apixaban (ELIQUIS) tablet 5 mg, 5 mg, Oral, Q12H, Jose Alfredo Mcbride MD  •  atorvastatin (LIPITOR) tablet 20 mg, 20 mg, Oral, Nightly, Jose Alfredo Mcbride MD  •  dextrose (D50W) (25 g/50 mL) IV injection 25 g, 25 g, Intravenous, Q15 Min PRN, Jose Alfredo Mcbride MD  •  dextrose (GLUTOSE) oral gel 15 g, 15 g, Oral, Q15 Min PRN, Jose Alfredo Mcbride MD  •  dilTIAZem (CARDIZEM) 125 mg in 125 mL NS infusion, 5-15 mg/hr, Intravenous, Titrated, Jose Alfredo Mcbride MD  •  docusate sodium (COLACE) capsule 100 mg, 100 mg, Oral, BID PRN, Jose Alfredo Mcbride MD  •  flecainide (TAMBOCOR) tablet 100 mg, 100 mg, Oral, Q12H, Jose Alfredo Mcbride MD  •  glucagon (human recombinant) (GLUCAGEN DIAGNOSTIC) injection 1 mg, 1 mg, Subcutaneous, Q15 Min PRN, Jose Alfredo Mcbrdie MD  •  guaiFENesin (MUCINEX) 12 hr tablet 1,200 mg, 1,200 mg, Oral, Q12H, Jose Alfredo Mcbride MD  •  hydrALAZINE (APRESOLINE) tablet 25 mg, 25 mg, Oral, Q8H, Jose Alfredo Mcbride MD  •  HYDROcod Polst-CPM Polst ER (TUSSIONEX PENNKINETIC) 10-8 MG/5ML ER suspension 5 mL, 5 mL, Oral, Q12H PRN, Jose Alfredo Mcbride MD  •  Insulin Lispro (humaLOG) injection 2-7 Units, 2-7 Units, Subcutaneous, 4x Daily AC & at Bedtime, Jose Alfredo Mcbride MD  •  ipratropium (ATROVENT) nebulizer solution 0.5 mg, 0.5 mg, Nebulization, TID - RT, Jose Alfredo Mcbride MD  •  levalbuterol (XOPENEX) nebulizer solution 1.25 mg, 1.25 mg, Nebulization, TID - RT, Jose Alfredo Mcbride MD  •  losartan (COZAAR) tablet 100 mg, 100 mg, Oral, Q24H, Jose Alfredo Mcbride MD  •  metoprolol succinate XL (TOPROL-XL) 24  "hr tablet 25 mg, 25 mg, Oral, Q24H, Jose Alfredo Mcbride MD  •  metoprolol tartrate (LOPRESSOR) injection 5 mg, 5 mg, Intravenous, Q4H PRN, Jose Alfredo Mcbride MD  •  montelukast (SINGULAIR) tablet 10 mg, 10 mg, Oral, Nightly, Jose Alfredo Mcbride MD  •  nystatin (MYCOSTATIN) powder, , Topical, Q8H, Jose Alferdo Mcbride MD  •  nystatin (MYCOSTATIN) powder, , Topical, Q12H, Jose Alfredo Mcbride MD  •  nystatin (MYCOSTATIN) powder, , Topical, BID PRN, Jose Alfredo Mcbride MD  •  ondansetron (ZOFRAN) tablet 4 mg, 4 mg, Oral, Q6H PRN **OR** ondansetron (ZOFRAN) injection 4 mg, 4 mg, Intravenous, Q6H PRN, Jose Alfredo Mcbride MD  •  pantoprazole (PROTONIX) EC tablet 40 mg, 40 mg, Oral, BID AC, Jose Alfredo Mcbride MD  •  potassium chloride (MICRO-K) CR capsule 20 mEq, 20 mEq, Oral, Daily, Jose Alfredo Mcbride MD  •  predniSONE (DELTASONE) tablet 20 mg, 20 mg, Oral, BID With Meals, Jose Alfredo Mcbride MD  •  scopolamine patch 1 mg/72 hr, 1 patch, Transdermal, Q72H, Jose Alfredo Mcbride MD  •  sodium chloride 0.9 % flush 10 mL, 10 mL, Intravenous, Q12H, Jose Alfredo Mcbride MD  •  sodium chloride 0.9 % flush 10 mL, 10 mL, Intravenous, PRN, Jose Alfredo Mcbride MD  •  torsemide (DEMADEX) tablet 20 mg, 20 mg, Oral, Daily, Jose Alfredo Mcbride MD  •  zolpidem (AMBIEN) tablet 5 mg, 5 mg, Oral, Nightly, Jose Alfredo Mcbride MD    Data:     Code Status:    There are no questions and answers to display.       No family history on file.    Social History     Socioeconomic History   • Marital status:        Vitals:  Ht 162.6 cm (64\")   Wt 98 kg (216 lb 0.8 oz)   BMI 37.09 kg/m²   T 97.8 HR 78 RR 30 /57 SPO2 94% 6L nc          I/O (24Hr):  No intake or output data in the 24 hours ending 12/31/22 0912    Labs and imaging:      No results found for this or any previous visit (from the past 12 hour(s)).                            Physical Examination:        Physical Exam  Vitals and " nursing note reviewed.   Constitutional:       Appearance: Normal appearance. She is obese.   HENT:      Head: Normocephalic and atraumatic.      Right Ear: External ear normal.      Left Ear: External ear normal.      Nose: Nose normal.      Mouth/Throat:      Mouth: Mucous membranes are moist.      Pharynx: Oropharynx is clear.   Eyes:      Extraocular Movements: Extraocular movements intact.      Conjunctiva/sclera: Conjunctivae normal.      Pupils: Pupils are equal, round, and reactive to light.   Cardiovascular:      Rate and Rhythm: Normal rate. Rhythm irregular.      Pulses: Normal pulses.      Heart sounds: Normal heart sounds.   Pulmonary:      Effort: Pulmonary effort is normal.      Breath sounds: Normal breath sounds.   Abdominal:      General: Bowel sounds are normal.      Palpations: Abdomen is soft.   Musculoskeletal:         General: Normal range of motion.      Cervical back: Normal range of motion and neck supple.   Skin:     General: Skin is warm and dry.      Capillary Refill: Capillary refill takes 2 to 3 seconds.   Neurological:      General: No focal deficit present.      Mental Status: She is alert and oriented to person, place, and time. Mental status is at baseline.      Motor: Weakness present.   Psychiatric:         Mood and Affect: Mood normal.         Behavior: Behavior normal.         Thought Content: Thought content normal.         Judgment: Judgment normal.           Assessment:        Primary Problem  <principal problem not specified>       * No active hospital problems. *    No past medical history on file.     Plan:        1. Acute hypoxemic respiratory failure  2. Pulmonary Emboli  3. Heart failure with preserved ejection fraction  4. Pulmonary fibrosis  5. Severe tracheomalacia  6. Afib with RVR  7. Hypertension  8. Acute exacerbation of COPD  9. Hyperglycemia     Continue current treatments. Oxygen weaning. Consult PT/OT/SLP for evaluation and treatment. Maintain patient  safety. Glycemic control. Labs on Monday.       Electronically signed by KD Grey on 12/31/2022 at 09:12 CST

## 2023-01-01 ENCOUNTER — APPOINTMENT (OUTPATIENT)
Dept: GENERAL RADIOLOGY | Facility: HOSPITAL | Age: 78
End: 2023-01-01
Payer: COMMERCIAL

## 2023-01-01 LAB
GLUCOSE BLDC GLUCOMTR-MCNC: 121 MG/DL (ref 70–130)
GLUCOSE BLDC GLUCOMTR-MCNC: 137 MG/DL (ref 70–130)
GLUCOSE BLDC GLUCOMTR-MCNC: 229 MG/DL (ref 70–130)
GLUCOSE BLDC GLUCOMTR-MCNC: 76 MG/DL (ref 70–130)

## 2023-01-01 PROCEDURE — 97530 THERAPEUTIC ACTIVITIES: CPT

## 2023-01-01 PROCEDURE — 63710000001 PREDNISONE PER 1 MG: Performed by: NURSE PRACTITIONER

## 2023-01-01 PROCEDURE — 71045 X-RAY EXAM CHEST 1 VIEW: CPT

## 2023-01-01 PROCEDURE — 63710000001 LEVALBUTEROL PER 0.5 MG: Performed by: NURSE PRACTITIONER

## 2023-01-01 PROCEDURE — 82962 GLUCOSE BLOOD TEST: CPT

## 2023-01-01 PROCEDURE — 97116 GAIT TRAINING THERAPY: CPT

## 2023-01-01 PROCEDURE — 63710000001 INSULIN LISPRO (HUMAN) PER 5 UNITS: Performed by: INTERNAL MEDICINE

## 2023-01-01 PROCEDURE — 99232 SBSQ HOSP IP/OBS MODERATE 35: CPT | Performed by: INTERNAL MEDICINE

## 2023-01-01 NOTE — PROGRESS NOTES
[facility] PULMONARY AND CRITICAL CARE PROGRESS NOTE - [facility]    Patient: Keisha Resendiz    1945   Acct# [id number]  01/01/23   10:41 CST  Referring Provider: Jose Alfredo Mcbride MD  Chief Complaint: acute on chronic respiratory failure   Interval history: She is seen awake and alert resting in bed. She tells me she sat up in the chair yesterday for several hours. She is feeling a little stronger. Oxygen saturation stable on 4l nasal cannula which is her home baseline. She reports desaturation with exertion. Chest xray reviewed. No new labs for review. Heart rate improved. Afebrile. No other issues reported overnight.   Review of Systems: Review of Systems   Constitutional: Positive for fatigue.   Respiratory: Positive for cough and shortness of breath.    Cardiovascular: Negative for chest pain.   Gastrointestinal: Negative for diarrhea and nausea.   Neurological: Positive for weakness.      Physical Exam:  Vital signs: T:98.1   BP:117/72   P:70   R:24   Sat:94  Physical Exam  Constitutional:       General: She is not in acute distress.     Appearance: She is obese. She is ill-appearing.      Interventions: Nasal cannula in place.   HENT:      Head: Normocephalic.      Nose: Nose normal.      Mouth/Throat:      Mouth: Mucous membranes are moist.   Eyes:      General: No scleral icterus.  Cardiovascular:      Rate and Rhythm: Normal rate.   Pulmonary:      Effort: No respiratory distress.      Breath sounds: Decreased breath sounds present.   Abdominal:      General: There is no distension.   Musculoskeletal:      Right lower leg: Edema present.      Left lower leg: Edema present.   Skin:     Findings: Bruising present.      Comments: Chronic venous stasis changes to ble    Neurological:      Mental Status: She is alert and oriented to person, place, and time.   Psychiatric:         Mood and Affect: Mood normal.         Behavior: Behavior is cooperative.         Electronically signed by  Anastasia Doe, APRN, 1/1/2023, 10:41 CST      Physician substantive portion: medical decision making    Laboratory Data:  Results from last 7 days   Lab Units 12/30/22  0408   WBC 10*3/mm3 12.53*   HEMOGLOBIN g/dL 14.8   PLATELETS 10*3/mm3 245     Results from last 7 days   Lab Units 12/30/22  0408   SODIUM mmol/L 140   POTASSIUM mmol/L 4.0   CO2 mmol/L 34.0*   BUN mg/dL 43*   CREATININE mg/dL 0.62       Recent films:  XR Chest 1 View    Result Date: 1/1/2023  EXAMINATION: XR CHEST 1 VW- 1/1/2023 9:25 AM CST  HISTORY: Abnormal lung auscultation. Short of breath  REPORT: A frontal view the chest was obtained.  COMPARISON: There are no correlative imaging studies for comparison.  There is one loss in the lung bases and blunting of the left costophrenic angle suggesting a small left pleural effusion or chronic pleural thickening. There is mild interstitial prominence, no lung consolidation is identified. Mild cardiomegaly is present without evidence of overt CHF. The osseous structures and upper abdomen appear acutely unremarkable.      Impression: Pulmonary hypoventilation with diffuse atelectasis, and interstitial prominence, no definite areas of consolidation. There is blunting of the left costophrenic angle which may represent small effusion or chronic pleural thickening. Mild cardiomegaly. No evidence of overt CHF. This report was finalized on 01/01/2023 09:27 by Dr. Jules Ceballos MD.     My radiograph interpretation/independent review of imaging: bibasilar atelectasis      Pulmonary Assessment:    1. Acute/subacute resp failure with hypoxia  2. Severe copd exacerbation  3. Mild leukocytosis due to acute illness    Recommend/plan:   · Continue steroids prednisone 20 mg per day for now  · Recommend stopping scopolamine  · Continue oxygen for saturation 90%      This visit was performed by both a physician and an [facility] RN.  I personally evaluated and examined the patient.  I performed all aspects of  the medical decision making as documented.  Electronically signed by Alejandro Batista MD, 1/1/2023, 13:09 CST

## 2023-01-01 NOTE — PROGRESS NOTES
[facility]  FABBY Sung APRN        Internal Medicine Progress Note    1/1/2023   12:47 CST    Name:  Keisha Resendiz  MRN:    [id number]     Acct:     [id number]   Room:  [address] [place] [address] Day: 0     Admit Date: 12/29/2022  3:21 PM  PCP: Shannon Lilly PA-C    Subjective:     C/C: Oxygen weaning and rehabilitation efforts    Interval History: Status: Improved. Pt resting in bed, no family at bedside. Pt tolerating O2 @6L. Complains of hip pain and requested tylenol from staff. No other concerns at this time.   BS stable.   Review of Systems   Constitutional: Negative for chills, decreased appetite and fever.   HENT: Negative for congestion, ear pain and sore throat.    Eyes: Negative for blurred vision, discharge and pain.   Cardiovascular: Positive for dyspnea on exertion and irregular heartbeat. Negative for chest pain and claudication.   Respiratory: Positive for shortness of breath. Negative for cough.    Endocrine: Negative for cold intolerance, heat intolerance, polydipsia, polyphagia and polyuria.   Skin: Negative for flushing, itching, nail changes and rash.   Musculoskeletal: Positive for falls and muscle weakness. Negative for arthritis and back pain.   Gastrointestinal: Negative for bloating, abdominal pain and diarrhea.   Genitourinary: Negative for bladder incontinence, dysuria and flank pain.   Neurological: Positive for weakness. Negative for difficulty with concentration, disturbances in coordination and headaches.   Psychiatric/Behavioral: Positive for altered mental status. Negative for depression, substance abuse, suicidal ideas and thoughts of violence.   Allergic/Immunologic: Negative for environmental allergies, HIV exposure, hives and persistent infections.         Medications:     Allergies:   Allergies   Allergen Reactions   • Clindamycin/Lincomycin Unknown - High Severity   • Keflex [Cephalexin] Unknown - High Severity   • Penicillins Unknown - High Severity    • Sulfa Antibiotics Unknown - High Severity       Current Meds:   Current Facility-Administered Medications:   •  acetaminophen (TYLENOL) tablet 650 mg, 650 mg, Oral, Q4H PRN **OR** acetaminophen (TYLENOL) suppository 650 mg, 650 mg, Rectal, Q4H PRN, Jose Alfredo Mcbride MD  •  ALPRAZolam (XANAX) tablet 0.5 mg, 0.5 mg, Oral, BID PRN, Jose Alfredo Mcbride MD  •  apixaban (ELIQUIS) tablet 5 mg, 5 mg, Oral, Q12H, Jose Alfredo Mcbride MD  •  atorvastatin (LIPITOR) tablet 20 mg, 20 mg, Oral, Nightly, Jose Alfredo Mcbride MD  •  dextrose (D50W) (25 g/50 mL) IV injection 25 g, 25 g, Intravenous, Q15 Min PRN, Jose Alfredo Mcbride MD  •  dextrose (GLUTOSE) oral gel 15 g, 15 g, Oral, Q15 Min PRN, Jose Alfredo Mcbride MD  •  dilTIAZem (CARDIZEM) 125 mg in 125 mL NS infusion, 5-15 mg/hr, Intravenous, Titrated, Jose Alfredo Mcbride MD  •  docusate sodium (COLACE) capsule 100 mg, 100 mg, Oral, BID PRN, Jose Alfredo Mcbride MD  •  flecainide (TAMBOCOR) tablet 100 mg, 100 mg, Oral, Q12H, Jose Alfredo Mcbride MD  •  glucagon (human recombinant) (GLUCAGEN DIAGNOSTIC) injection 1 mg, 1 mg, Subcutaneous, Q15 Min PRN, Jose Alfredo Mcbride MD  •  guaiFENesin (MUCINEX) 12 hr tablet 1,200 mg, 1,200 mg, Oral, Q12H, Jose Alfredo Mcbride MD  •  hydrALAZINE (APRESOLINE) tablet 25 mg, 25 mg, Oral, Q8H, Jose Alfredo Mcbride MD  •  HYDROcod Polst-CPM Polst ER (TUSSIONEX PENNKINETIC) 10-8 MG/5ML ER suspension 5 mL, 5 mL, Oral, Q12H PRN, Jose Alfredo Mcbride MD  •  Insulin Lispro (humaLOG) injection 2-7 Units, 2-7 Units, Subcutaneous, 4x Daily AC & at Bedtime, Jose Alfredo Mcbride MD  •  ipratropium (ATROVENT) nebulizer solution 0.5 mg, 0.5 mg, Nebulization, TID - RT, Jose Alfredo Mcbride MD  •  levalbuterol (XOPENEX) nebulizer solution 0.63 mg, 0.63 mg, Nebulization, Q4H - RT, Anastasia Doe APRN  •  losartan (COZAAR) tablet 100 mg, 100 mg, Oral, Q24H, Jose Alfredo Mcbride MD  •  metoprolol succinate XL  "(TOPROL-XL) 24 hr tablet 25 mg, 25 mg, Oral, Q24H, Jose Alfredo Mcbride MD  •  metoprolol tartrate (LOPRESSOR) injection 5 mg, 5 mg, Intravenous, Q4H PRN, Jose Alfredo Mcbride MD  •  montelukast (SINGULAIR) tablet 10 mg, 10 mg, Oral, Nightly, Jose Alfredo Mcbride MD  •  nystatin (MYCOSTATIN) powder, , Topical, Q8H, Jose Alfredo Mcbride MD  •  nystatin (MYCOSTATIN) powder, , Topical, Q12H, Jose Alfredo Mcbride MD  •  nystatin (MYCOSTATIN) powder, , Topical, BID PRN, Jose Alfredo Mcbride MD  •  ondansetron (ZOFRAN) tablet 4 mg, 4 mg, Oral, Q6H PRN **OR** ondansetron (ZOFRAN) injection 4 mg, 4 mg, Intravenous, Q6H PRN, Jose Alfredo Mcbride MD  •  pantoprazole (PROTONIX) EC tablet 40 mg, 40 mg, Oral, BID AC, Jose Alfredo Mcbride MD  •  potassium chloride (MICRO-K) CR capsule 20 mEq, 20 mEq, Oral, Daily, Jose Alfredo Mcbride MD  •  predniSONE (DELTASONE) tablet 20 mg, 20 mg, Oral, Daily, Anastasia Doe, APRN  •  scopolamine patch 1 mg/72 hr, 1 patch, Transdermal, Q72H, Jose Alfredo Mcbride MD  •  sodium chloride 0.9 % flush 10 mL, 10 mL, Intravenous, Q12H, Jose Alfredo Mcbride MD  •  sodium chloride 0.9 % flush 10 mL, 10 mL, Intravenous, PRN, Jose Alfredo Mcbride MD  •  torsemide (DEMADEX) tablet 20 mg, 20 mg, Oral, Daily, Jose Alfredo Mcbride MD  •  zolpidem (AMBIEN) tablet 5 mg, 5 mg, Oral, Nightly, Jose Alfredo Mcbride MD    Data:     Code Status:    There are no questions and answers to display.       No family history on file.    Social History     Socioeconomic History   • Marital status:        Vitals:  Ht 162.6 cm (64\")   Wt 98 kg (216 lb 0.8 oz)   BMI 37.09 kg/m²   T 98.1 HR 70 RR 24 /72 SPO2 94% 6L nc          I/O (24Hr):  No intake or output data in the 24 hours ending 01/01/23 1247    Labs and imaging:      Recent Results (from the past 12 hour(s))   POC Glucose Once    Collection Time: 01/01/23  7:10 AM    Specimen: Blood   Result Value Ref Range    Glucose 76 70 - " 130 mg/dL   POC Glucose Once    Collection Time: 01/01/23 10:59 AM    Specimen: Blood   Result Value Ref Range    Glucose 137 (H) 70 - 130 mg/dL                               Physical Examination:        Physical Exam  Vitals and nursing note reviewed.   Constitutional:       Appearance: Normal appearance. She is obese.   HENT:      Head: Normocephalic and atraumatic.      Right Ear: External ear normal.      Left Ear: External ear normal.      Nose: Nose normal.      Mouth/Throat:      Mouth: Mucous membranes are moist.      Pharynx: Oropharynx is clear.   Eyes:      Extraocular Movements: Extraocular movements intact.      Conjunctiva/sclera: Conjunctivae normal.      Pupils: Pupils are equal, round, and reactive to light.   Cardiovascular:      Rate and Rhythm: Normal rate. Rhythm irregular.      Pulses: Normal pulses.      Heart sounds: Normal heart sounds.   Pulmonary:      Effort: Pulmonary effort is normal.      Breath sounds: Normal breath sounds.   Abdominal:      General: Bowel sounds are normal.      Palpations: Abdomen is soft.   Musculoskeletal:         General: Normal range of motion.      Cervical back: Normal range of motion and neck supple.   Skin:     General: Skin is warm and dry.      Capillary Refill: Capillary refill takes 2 to 3 seconds.   Neurological:      General: No focal deficit present.      Mental Status: She is alert and oriented to person, place, and time. Mental status is at baseline.      Motor: Weakness present.   Psychiatric:         Mood and Affect: Mood normal.         Behavior: Behavior normal.         Thought Content: Thought content normal.         Judgment: Judgment normal.           Assessment:        Primary Problem  <principal problem not specified>       * No active hospital problems. *    No past medical history on file.     Plan:        1. Acute hypoxemic respiratory failure  2. Pulmonary Emboli  3. Heart failure with preserved ejection fraction  4. Pulmonary  fibrosis  5. Severe tracheomalacia  6. Afib with RVR  7. Hypertension  8. Acute exacerbation of COPD  9. Hyperglycemia     Continue current treatments. Oxygen weaning. Consult PT/OT/SLP for evaluation and treatment. Maintain patient safety. Glycemic control. Labs on Monday.       Electronically signed by KD Grey on 1/1/2023 at 12:47 CST

## 2023-01-02 ENCOUNTER — APPOINTMENT (OUTPATIENT)
Dept: GENERAL RADIOLOGY | Facility: HOSPITAL | Age: 78
End: 2023-01-02
Payer: COMMERCIAL

## 2023-01-02 LAB
ALBUMIN SERPL-MCNC: 3.3 G/DL (ref 3.5–5.2)
ALBUMIN SERPL-MCNC: 3.4 G/DL (ref 3.5–5.2)
ALBUMIN/GLOB SERPL: 1 G/DL
ALBUMIN/GLOB SERPL: 1.1 G/DL
ALP SERPL-CCNC: 114 U/L (ref 39–117)
ALP SERPL-CCNC: 122 U/L (ref 39–117)
ALT SERPL W P-5'-P-CCNC: 29 U/L (ref 1–33)
ALT SERPL W P-5'-P-CCNC: 30 U/L (ref 1–33)
AMYLASE SERPL-CCNC: 50 U/L (ref 28–100)
ANION GAP SERPL CALCULATED.3IONS-SCNC: 11 MMOL/L (ref 5–15)
ANION GAP SERPL CALCULATED.3IONS-SCNC: 14 MMOL/L (ref 5–15)
AST SERPL-CCNC: 20 U/L (ref 1–32)
AST SERPL-CCNC: 20 U/L (ref 1–32)
BASOPHILS # BLD AUTO: 0.05 10*3/MM3 (ref 0–0.2)
BASOPHILS # BLD AUTO: 0.07 10*3/MM3 (ref 0–0.2)
BASOPHILS NFR BLD AUTO: 0.3 % (ref 0–1.5)
BASOPHILS NFR BLD AUTO: 0.4 % (ref 0–1.5)
BILIRUB SERPL-MCNC: 0.8 MG/DL (ref 0–1.2)
BILIRUB SERPL-MCNC: 0.9 MG/DL (ref 0–1.2)
BUN SERPL-MCNC: 36 MG/DL (ref 8–23)
BUN SERPL-MCNC: 39 MG/DL (ref 8–23)
BUN/CREAT SERPL: 56.3 (ref 7–25)
BUN/CREAT SERPL: 60.9 (ref 7–25)
CALCIUM SPEC-SCNC: 8.9 MG/DL (ref 8.6–10.5)
CALCIUM SPEC-SCNC: 9 MG/DL (ref 8.6–10.5)
CHLORIDE SERPL-SCNC: 93 MMOL/L (ref 98–107)
CHLORIDE SERPL-SCNC: 95 MMOL/L (ref 98–107)
CO2 SERPL-SCNC: 30 MMOL/L (ref 22–29)
CO2 SERPL-SCNC: 32 MMOL/L (ref 22–29)
CREAT SERPL-MCNC: 0.64 MG/DL (ref 0.57–1)
CREAT SERPL-MCNC: 0.64 MG/DL (ref 0.57–1)
CRP SERPL-MCNC: 5.84 MG/DL (ref 0–0.5)
DEPRECATED RDW RBC AUTO: 53.6 FL (ref 37–54)
DEPRECATED RDW RBC AUTO: 54.1 FL (ref 37–54)
EGFRCR SERPLBLD CKD-EPI 2021: 91.2 ML/MIN/1.73
EGFRCR SERPLBLD CKD-EPI 2021: 91.2 ML/MIN/1.73
EOSINOPHIL # BLD AUTO: 0.08 10*3/MM3 (ref 0–0.4)
EOSINOPHIL # BLD AUTO: 0.1 10*3/MM3 (ref 0–0.4)
EOSINOPHIL NFR BLD AUTO: 0.4 % (ref 0.3–6.2)
EOSINOPHIL NFR BLD AUTO: 0.5 % (ref 0.3–6.2)
ERYTHROCYTE [DISTWIDTH] IN BLOOD BY AUTOMATED COUNT: 16.1 % (ref 12.3–15.4)
ERYTHROCYTE [DISTWIDTH] IN BLOOD BY AUTOMATED COUNT: 16.2 % (ref 12.3–15.4)
ERYTHROCYTE [SEDIMENTATION RATE] IN BLOOD: 78 MM/HR (ref 0–30)
GLOBULIN UR ELPH-MCNC: 3.2 GM/DL
GLOBULIN UR ELPH-MCNC: 3.2 GM/DL
GLUCOSE BLDC GLUCOMTR-MCNC: 114 MG/DL (ref 70–130)
GLUCOSE BLDC GLUCOMTR-MCNC: 140 MG/DL (ref 70–130)
GLUCOSE BLDC GLUCOMTR-MCNC: 156 MG/DL (ref 70–130)
GLUCOSE BLDC GLUCOMTR-MCNC: 164 MG/DL (ref 70–130)
GLUCOSE SERPL-MCNC: 119 MG/DL (ref 65–99)
GLUCOSE SERPL-MCNC: 192 MG/DL (ref 65–99)
HCT VFR BLD AUTO: 47.9 % (ref 34–46.6)
HCT VFR BLD AUTO: 50.3 % (ref 34–46.6)
HGB BLD-MCNC: 15 G/DL (ref 12–15.9)
HGB BLD-MCNC: 15.4 G/DL (ref 12–15.9)
IMM GRANULOCYTES # BLD AUTO: 0.26 10*3/MM3 (ref 0–0.05)
IMM GRANULOCYTES # BLD AUTO: 0.28 10*3/MM3 (ref 0–0.05)
IMM GRANULOCYTES NFR BLD AUTO: 1.3 % (ref 0–0.5)
IMM GRANULOCYTES NFR BLD AUTO: 1.5 % (ref 0–0.5)
LIPASE SERPL-CCNC: 31 U/L (ref 13–60)
LYMPHOCYTES # BLD AUTO: 1.57 10*3/MM3 (ref 0.7–3.1)
LYMPHOCYTES # BLD AUTO: 2.71 10*3/MM3 (ref 0.7–3.1)
LYMPHOCYTES NFR BLD AUTO: 13.8 % (ref 19.6–45.3)
LYMPHOCYTES NFR BLD AUTO: 8.5 % (ref 19.6–45.3)
MCH RBC QN AUTO: 27.7 PG (ref 26.6–33)
MCH RBC QN AUTO: 28.4 PG (ref 26.6–33)
MCHC RBC AUTO-ENTMCNC: 30.6 G/DL (ref 31.5–35.7)
MCHC RBC AUTO-ENTMCNC: 31.3 G/DL (ref 31.5–35.7)
MCV RBC AUTO: 90.5 FL (ref 79–97)
MCV RBC AUTO: 90.6 FL (ref 79–97)
MONOCYTES # BLD AUTO: 1.52 10*3/MM3 (ref 0.1–0.9)
MONOCYTES # BLD AUTO: 1.64 10*3/MM3 (ref 0.1–0.9)
MONOCYTES NFR BLD AUTO: 8.3 % (ref 5–12)
MONOCYTES NFR BLD AUTO: 8.3 % (ref 5–12)
NEUTROPHILS NFR BLD AUTO: 14.89 10*3/MM3 (ref 1.7–7)
NEUTROPHILS NFR BLD AUTO: 14.92 10*3/MM3 (ref 1.7–7)
NEUTROPHILS NFR BLD AUTO: 75.7 % (ref 42.7–76)
NEUTROPHILS NFR BLD AUTO: 81 % (ref 42.7–76)
NRBC BLD AUTO-RTO: 0 /100 WBC (ref 0–0.2)
NRBC BLD AUTO-RTO: 0 /100 WBC (ref 0–0.2)
PLATELET # BLD AUTO: 253 10*3/MM3 (ref 140–450)
PLATELET # BLD AUTO: 272 10*3/MM3 (ref 140–450)
PMV BLD AUTO: 10.8 FL (ref 6–12)
PMV BLD AUTO: 11.1 FL (ref 6–12)
POTASSIUM SERPL-SCNC: 3.3 MMOL/L (ref 3.5–5.2)
POTASSIUM SERPL-SCNC: 3.4 MMOL/L (ref 3.5–5.2)
PROCALCITONIN SERPL-MCNC: 0.06 NG/ML (ref 0–0.25)
PROT SERPL-MCNC: 6.5 G/DL (ref 6–8.5)
PROT SERPL-MCNC: 6.6 G/DL (ref 6–8.5)
RBC # BLD AUTO: 5.29 10*6/MM3 (ref 3.77–5.28)
RBC # BLD AUTO: 5.55 10*6/MM3 (ref 3.77–5.28)
SODIUM SERPL-SCNC: 136 MMOL/L (ref 136–145)
SODIUM SERPL-SCNC: 139 MMOL/L (ref 136–145)
WBC NRBC COR # BLD: 18.39 10*3/MM3 (ref 3.4–10.8)
WBC NRBC COR # BLD: 19.7 10*3/MM3 (ref 3.4–10.8)

## 2023-01-02 PROCEDURE — 63710000001 PREDNISONE PER 1 MG: Performed by: NURSE PRACTITIONER

## 2023-01-02 PROCEDURE — 85025 COMPLETE CBC W/AUTO DIFF WBC: CPT | Performed by: INTERNAL MEDICINE

## 2023-01-02 PROCEDURE — 63710000001 INSULIN LISPRO (HUMAN) PER 5 UNITS: Performed by: INTERNAL MEDICINE

## 2023-01-02 PROCEDURE — 84145 PROCALCITONIN (PCT): CPT | Performed by: INTERNAL MEDICINE

## 2023-01-02 PROCEDURE — 97535 SELF CARE MNGMENT TRAINING: CPT

## 2023-01-02 PROCEDURE — 74018 RADEX ABDOMEN 1 VIEW: CPT

## 2023-01-02 PROCEDURE — 63710000001 LEVALBUTEROL PER 0.5 MG: Performed by: NURSE PRACTITIONER

## 2023-01-02 PROCEDURE — 82150 ASSAY OF AMYLASE: CPT | Performed by: INTERNAL MEDICINE

## 2023-01-02 PROCEDURE — 97530 THERAPEUTIC ACTIVITIES: CPT

## 2023-01-02 PROCEDURE — 99232 SBSQ HOSP IP/OBS MODERATE 35: CPT | Performed by: INTERNAL MEDICINE

## 2023-01-02 PROCEDURE — 25010000002 ONDANSETRON PER 1 MG: Performed by: INTERNAL MEDICINE

## 2023-01-02 PROCEDURE — 97116 GAIT TRAINING THERAPY: CPT

## 2023-01-02 PROCEDURE — 86140 C-REACTIVE PROTEIN: CPT | Performed by: INTERNAL MEDICINE

## 2023-01-02 PROCEDURE — 82962 GLUCOSE BLOOD TEST: CPT

## 2023-01-02 PROCEDURE — 97110 THERAPEUTIC EXERCISES: CPT

## 2023-01-02 PROCEDURE — 80053 COMPREHEN METABOLIC PANEL: CPT | Performed by: INTERNAL MEDICINE

## 2023-01-02 PROCEDURE — 83690 ASSAY OF LIPASE: CPT | Performed by: INTERNAL MEDICINE

## 2023-01-02 PROCEDURE — 85652 RBC SED RATE AUTOMATED: CPT | Performed by: INTERNAL MEDICINE

## 2023-01-02 RX ORDER — POLYETHYLENE GLYCOL 3350 17 G/17G
17 POWDER, FOR SOLUTION ORAL DAILY PRN
Status: DISCONTINUED | OUTPATIENT
Start: 2023-01-02 | End: 2023-01-03

## 2023-01-02 RX ORDER — POTASSIUM CHLORIDE 750 MG/1
40 CAPSULE, EXTENDED RELEASE ORAL EVERY 4 HOURS
Status: DISCONTINUED | OUTPATIENT
Start: 2023-01-02 | End: 2023-01-02

## 2023-01-02 NOTE — PROGRESS NOTES
[facility] [place] PULMONARY AND CRITICAL CARE PROGRESS NOTE - Formerly [facility]  Patient: Keisha Resendiz    1945   Acct# [id number]  01/02/23   07:10 CST  Referring Provider: Jose Alfredo Mcbride MD  Chief Complaint: Shortness of breath  Interval history: Afebrile.  She is seen awake and alert resting in bed. Saturation 94 on 3.  She reports her breathing is better.  Still short of breath with exertion.  No imaging for review.  White count trending up to 19.7.  Sed rate 78.  She complains of epigastric pain and nausea today.  She indicates this is a new problem.    Orders placed for CBC with differential, CMP, procalcitonin, amylase, lipase and stat KUB.    Physical Exam:  Vital signs: T:97.6   BP:111/57   P:80   R:26   Sat:94 on 3L  Physical Exam   Physical Exam  Constitutional:       General: She is not in acute distress.     Appearance: She is obese. She is ill-appearing.      Interventions: Nasal cannula in place.   HENT:      Head: Normocephalic.      Nose: Nose normal.      Mouth/Throat:      Mouth: Mucous membranes are moist.   Eyes:      General: No scleral icterus.  Cardiovascular:      Rate and Rhythm: Normal rate.   Pulmonary:      Effort: No respiratory distress.      Breath sounds: Decreased breath sounds present.   Abdominal:      General: There is no distension.   Musculoskeletal:      Right lower leg: Edema present.      Left lower leg: Edema present.   Skin:     Findings: Bruising present.      Comments: Chronic venous stasis changes to ble    Neurological:      Mental Status: She is alert and oriented to person, place, and time.   Psychiatric:         Mood and Affect: Mood normal.         Behavior: Behavior is cooperative.     Electronically signed by KD White, 1/2/2023, 07:10 CST      Physician substantive portion: medical decision making    Laboratory Data:  Results from last 7 days   Lab Units 01/02/23  0951 01/02/23  0511 12/30/22  0408   WBC 10*3/mm3 18.39* 19.70* 12.53*    HEMOGLOBIN g/dL 15.0 15.4 14.8   PLATELETS 10*3/mm3 253 272 245     Results from last 7 days   Lab Units 01/02/23  0951 01/02/23  0511 12/30/22  0408   SODIUM mmol/L 136 139 140   POTASSIUM mmol/L 3.3* 3.4* 4.0   CO2 mmol/L 32.0* 30.0* 34.0*   BUN mg/dL 36* 39* 43*   CREATININE mg/dL 0.64 0.64 0.62   CRP mg/dL  --  5.84*  --          @LASTLAB3(BLOODCX:*,URINECX:*,WOUNDCX:*,MRSACX:*,RESPCX:*,STOOLCX:*)@  Recent films:  XR Chest 1 View    Result Date: 1/1/2023  EXAMINATION: XR CHEST 1 VW- 1/1/2023 9:25 AM CST  HISTORY: Abnormal lung auscultation. Short of breath  REPORT: A frontal view the chest was obtained.  COMPARISON: There are no correlative imaging studies for comparison.  There is one loss in the lung bases and blunting of the left costophrenic angle suggesting a small left pleural effusion or chronic pleural thickening. There is mild interstitial prominence, no lung consolidation is identified. Mild cardiomegaly is present without evidence of overt CHF. The osseous structures and upper abdomen appear acutely unremarkable.      Impression: Pulmonary hypoventilation with diffuse atelectasis, and interstitial prominence, no definite areas of consolidation. There is blunting of the left costophrenic angle which may represent small effusion or chronic pleural thickening. Mild cardiomegaly. No evidence of overt CHF. This report was finalized on 01/01/2023 09:27 by Dr. Jules Ceballos MD.    XR Abdomen KUB    Result Date: 1/2/2023  EXAMINATION: XR ABDOMEN KUB- 1/2/2023 10:38 AM CST  HISTORY: NAUSEA, EPIGASTRIC PAIN.  REPORT: A supine view of the abdomen was obtained.  COMPARISON: There are no correlative imaging studies for comparison.  A large amount of stool seen throughout the colon, no bowel dilation or evidence of obstruction is identified. Cholecystectomy clips are present. There is a surgical staple line over the midline pelvis. Small amounts of barium were dense material are noted within the splenic flexure  of colon and cecum. Diffuse degenerative changes of the lumbar spine. No acute osseous abnormality.      Impression: Moderate constipation. This report was finalized on 01/02/2023 10:39 by Dr. Jules Ceballos MD.     My radiograph interpretation/independent review of imaging: Stool in colon, no free air      Pulmonary Assessment:    1. Exacerbation of COPD improved, severe  2. Acute respiratory failure with hypoxia improved  3. Leukocytosis stable    Recommend/plan:   · KUB ordered and reviewed today because of the abdominal pain.  Perhaps constipation aggravated by medications  · Continue bronchodilators  · Continue oral steroids  · Discontinue scopolamine  · Continue Xopenex      This visit was performed by both a physician and an [facility] RN.  I personally evaluated and examined the patient.  I performed all aspects of the medical decision making as documented.  Electronically signed by Alejandro Batista MD, 1/2/2023, 11:32 CST

## 2023-01-02 NOTE — PROGRESS NOTES
FABBY Zendejas APRN        Internal Medicine Progress Note    1/2/2023   09:35 CST    Name:  Keisha Resendiz  MRN:    [id number]     Acct:     [id number]   Room:  [address] [place] [address] Day: 0     Admit Date: 12/29/2022  3:21 PM  PCP: Shannon Lilly PA-C    Subjective:     C/C: Oxygen weaning and rehabilitation efforts    Interval History: Status: Improved. Resting in bed. No family at bedside. Afebrile. C/o increased nausea this morning. 02 sats stable with 02 at 3 lpm. WBC up to 19 and potassium 3.4. Counts otherwise stable.   Review of Systems   Constitutional: Negative for chills, decreased appetite, fever, malaise/fatigue, weight gain and weight loss.   HENT: Negative for congestion, ear discharge, ear pain, hoarse voice, sore throat and tinnitus.    Eyes: Negative for blurred vision, discharge, pain, visual disturbance and visual halos.   Cardiovascular: Positive for dyspnea on exertion and irregular heartbeat. Negative for chest pain, claudication, leg swelling, orthopnea and paroxysmal nocturnal dyspnea.   Respiratory: Positive for shortness of breath. Negative for cough, sputum production and wheezing.    Endocrine: Negative for cold intolerance, heat intolerance, polydipsia, polyphagia and polyuria.   Hematologic/Lymphatic: Negative for adenopathy. Does not bruise/bleed easily.   Skin: Negative for dry skin, flushing, itching, nail changes, rash and suspicious lesions.   Musculoskeletal: Positive for falls and muscle weakness. Negative for arthritis, back pain, joint pain and myalgias.   Gastrointestinal: Negative for bloating, abdominal pain, constipation, diarrhea, dysphagia and hematemesis.   Genitourinary: Negative for bladder incontinence, dysuria, flank pain and frequency.   Neurological: Positive for weakness. Negative for aphonia, difficulty with concentration, disturbances in coordination, dizziness and headaches.   Psychiatric/Behavioral: Positive for altered  mental status. Negative for depression, memory loss, substance abuse, suicidal ideas and thoughts of violence. The patient does not have insomnia and is not nervous/anxious.    Allergic/Immunologic: Negative for environmental allergies, HIV exposure, hives and persistent infections.         Medications:     Allergies:   Allergies   Allergen Reactions   • Clindamycin/Lincomycin Unknown - High Severity   • Keflex [Cephalexin] Unknown - High Severity   • Penicillins Unknown - High Severity   • Sulfa Antibiotics Unknown - High Severity       Current Meds:   Current Facility-Administered Medications:   •  acetaminophen (TYLENOL) tablet 650 mg, 650 mg, Oral, Q4H PRN **OR** acetaminophen (TYLENOL) suppository 650 mg, 650 mg, Rectal, Q4H PRN, Jose Alfredo Mcbride MD  •  ALPRAZolam (XANAX) tablet 0.5 mg, 0.5 mg, Oral, BID PRN, Jose Alfredo Mcbride MD  •  apixaban (ELIQUIS) tablet 5 mg, 5 mg, Oral, Q12H, Jose Alfredo Mcbride MD  •  atorvastatin (LIPITOR) tablet 20 mg, 20 mg, Oral, Nightly, Jose Alfredo Mcbride MD  •  dextrose (D50W) (25 g/50 mL) IV injection 25 g, 25 g, Intravenous, Q15 Min PRN, Jose Alfredo Mcbride MD  •  dextrose (GLUTOSE) oral gel 15 g, 15 g, Oral, Q15 Min PRN, Jose Alfredo Mcbride MD  •  dilTIAZem (CARDIZEM) 125 mg in 125 mL NS infusion, 5-15 mg/hr, Intravenous, Titrated, Jose Alfredo Mcbride MD  •  docusate sodium (COLACE) capsule 100 mg, 100 mg, Oral, BID PRN, Jose Alfredo Mcbride MD  •  flecainide (TAMBOCOR) tablet 100 mg, 100 mg, Oral, Q12H, Jose Alfredo Mcbride MD  •  glucagon (human recombinant) (GLUCAGEN DIAGNOSTIC) injection 1 mg, 1 mg, Subcutaneous, Q15 Min PRN, Jose Alfredo Mcbride MD  •  guaiFENesin (MUCINEX) 12 hr tablet 1,200 mg, 1,200 mg, Oral, Q12H, Jose Alfredo Mcbride MD  •  hydrALAZINE (APRESOLINE) tablet 25 mg, 25 mg, Oral, Q8H, Jose Alfredo Mcbride MD  •  HYDROcod Polst-CPM Polst ER (TUSSIONEX PENNKINETIC) 10-8 MG/5ML ER suspension 5 mL, 5 mL, Oral, Q12H PRN,  Jose Alfredo Mcbride MD  •  Insulin Lispro (humaLOG) injection 2-7 Units, 2-7 Units, Subcutaneous, 4x Daily AC & at Bedtime, Jose Alfredo Mcbride MD  •  ipratropium (ATROVENT) nebulizer solution 0.5 mg, 0.5 mg, Nebulization, TID - RT, Jose Alfredo Mcbride MD  •  levalbuterol (XOPENEX) nebulizer solution 0.63 mg, 0.63 mg, Nebulization, Q4H - RT, Anastasia Doe, APRN  •  losartan (COZAAR) tablet 100 mg, 100 mg, Oral, Q24H, Jose Alfredo Mcbride MD  •  metoprolol succinate XL (TOPROL-XL) 24 hr tablet 25 mg, 25 mg, Oral, Q24H, Jose Alfredo Mcbride MD  •  metoprolol tartrate (LOPRESSOR) injection 5 mg, 5 mg, Intravenous, Q4H PRN, Jose Alfredo Mcbride MD  •  montelukast (SINGULAIR) tablet 10 mg, 10 mg, Oral, Nightly, Jose Alfredo Mcbride MD  •  nystatin (MYCOSTATIN) powder, , Topical, Q8H, Jose Alfredo Mcbride MD  •  nystatin (MYCOSTATIN) powder, , Topical, Q12H, Jose Alfredo Mcbride MD  •  nystatin (MYCOSTATIN) powder, , Topical, BID PRN, Jose Alfredo Mcbride MD  •  ondansetron (ZOFRAN) tablet 4 mg, 4 mg, Oral, Q6H PRN **OR** ondansetron (ZOFRAN) injection 4 mg, 4 mg, Intravenous, Q6H PRN, Jose Alfredo Mcbride MD  •  pantoprazole (PROTONIX) EC tablet 40 mg, 40 mg, Oral, BID AC, Jose Alfredo Mcbride MD  •  potassium chloride (MICRO-K) CR capsule 20 mEq, 20 mEq, Oral, Daily, Jose Alfredo Mcbride MD  •  potassium chloride (MICRO-K) CR capsule 40 mEq, 40 mEq, Oral, Q4H, Jose Alfredo Mcbride MD  •  predniSONE (DELTASONE) tablet 20 mg, 20 mg, Oral, Daily, Anastasia Doe, APRN  •  scopolamine patch 1 mg/72 hr, 1 patch, Transdermal, Q72H, Jose Alfredo Mcbride MD  •  sodium chloride 0.9 % flush 10 mL, 10 mL, Intravenous, Q12H, Jose Alfredo Mcbride MD  •  sodium chloride 0.9 % flush 10 mL, 10 mL, Intravenous, PRN, Jose Alfredo Mcbride MD  •  torsemide (DEMADEX) tablet 20 mg, 20 mg, Oral, Daily, Jose Alfredo Mcbride MD  •  zolpidem (AMBIEN) tablet 5 mg, 5 mg, Oral, Nightly, Jose Alfredo Mcbride,  "MD    Data:     Code Status:    There are no questions and answers to display.       No family history on file.    Social History     Socioeconomic History   • Marital status:        Vitals:  Ht 162.6 cm (64\")   Wt 118 kg (259 lb 3.2 oz) Comment: Charge nurse says this wt is accurate  BMI 44.49 kg/m²   T 97.6 HR 77 RR 20 /57 SPO2 91% (3 lpm)          I/O (24Hr):  No intake or output data in the 24 hours ending 01/02/23 0935    Labs and imaging:      Recent Results (from the past 12 hour(s))   POC Glucose Once    Collection Time: 01/01/23 10:33 PM    Specimen: Blood   Result Value Ref Range    Glucose 121 70 - 130 mg/dL   Comprehensive Metabolic Panel    Collection Time: 01/02/23  5:11 AM    Specimen: Blood   Result Value Ref Range    Glucose 119 (H) 65 - 99 mg/dL    BUN 39 (H) 8 - 23 mg/dL    Creatinine 0.64 0.57 - 1.00 mg/dL    Sodium 139 136 - 145 mmol/L    Potassium 3.4 (L) 3.5 - 5.2 mmol/L    Chloride 95 (L) 98 - 107 mmol/L    CO2 30.0 (H) 22.0 - 29.0 mmol/L    Calcium 9.0 8.6 - 10.5 mg/dL    Total Protein 6.6 6.0 - 8.5 g/dL    Albumin 3.4 (L) 3.5 - 5.2 g/dL    ALT (SGPT) 30 1 - 33 U/L    AST (SGOT) 20 1 - 32 U/L    Alkaline Phosphatase 122 (H) 39 - 117 U/L    Total Bilirubin 0.8 0.0 - 1.2 mg/dL    Globulin 3.2 gm/dL    A/G Ratio 1.1 g/dL    BUN/Creatinine Ratio 60.9 (H) 7.0 - 25.0    Anion Gap 14.0 5.0 - 15.0 mmol/L    eGFR 91.2 >60.0 mL/min/1.73   Sedimentation Rate    Collection Time: 01/02/23  5:11 AM    Specimen: Blood   Result Value Ref Range    Sed Rate 78 (H) 0 - 30 mm/hr   C-reactive Protein    Collection Time: 01/02/23  5:11 AM    Specimen: Blood   Result Value Ref Range    C-Reactive Protein 5.84 (H) 0.00 - 0.50 mg/dL   CBC Auto Differential    Collection Time: 01/02/23  5:11 AM    Specimen: Blood   Result Value Ref Range    WBC 19.70 (H) 3.40 - 10.80 10*3/mm3    RBC 5.55 (H) 3.77 - 5.28 10*6/mm3    Hemoglobin 15.4 12.0 - 15.9 g/dL    Hematocrit 50.3 (H) 34.0 - 46.6 %    MCV 90.6 79.0 - " 97.0 fL    MCH 27.7 26.6 - 33.0 pg    MCHC 30.6 (L) 31.5 - 35.7 g/dL    RDW 16.2 (H) 12.3 - 15.4 %    RDW-SD 54.1 (H) 37.0 - 54.0 fl    MPV 10.8 6.0 - 12.0 fL    Platelets 272 140 - 450 10*3/mm3    Neutrophil % 75.7 42.7 - 76.0 %    Lymphocyte % 13.8 (L) 19.6 - 45.3 %    Monocyte % 8.3 5.0 - 12.0 %    Eosinophil % 0.5 0.3 - 6.2 %    Basophil % 0.4 0.0 - 1.5 %    Immature Grans % 1.3 (H) 0.0 - 0.5 %    Neutrophils, Absolute 14.92 (H) 1.70 - 7.00 10*3/mm3    Lymphocytes, Absolute 2.71 0.70 - 3.10 10*3/mm3    Monocytes, Absolute 1.64 (H) 0.10 - 0.90 10*3/mm3    Eosinophils, Absolute 0.10 0.00 - 0.40 10*3/mm3    Basophils, Absolute 0.07 0.00 - 0.20 10*3/mm3    Immature Grans, Absolute 0.26 (H) 0.00 - 0.05 10*3/mm3    nRBC 0.0 0.0 - 0.2 /100 WBC   POC Glucose Once    Collection Time: 01/02/23  7:10 AM    Specimen: Blood   Result Value Ref Range    Glucose 140 (H) 70 - 130 mg/dL         Physical Examination:        Physical Exam  Vitals and nursing note reviewed.   Constitutional:       Appearance: Normal appearance. She is obese.   HENT:      Head: Normocephalic and atraumatic.      Right Ear: External ear normal.      Left Ear: External ear normal.      Nose: Nose normal.      Mouth/Throat:      Mouth: Mucous membranes are moist.      Pharynx: Oropharynx is clear.   Eyes:      Extraocular Movements: Extraocular movements intact.      Conjunctiva/sclera: Conjunctivae normal.      Pupils: Pupils are equal, round, and reactive to light.   Cardiovascular:      Rate and Rhythm: Normal rate. Rhythm irregular.      Pulses: Normal pulses.      Heart sounds: Normal heart sounds.   Pulmonary:      Effort: Pulmonary effort is normal.      Breath sounds: Normal breath sounds.   Abdominal:      General: Bowel sounds are normal.      Palpations: Abdomen is soft.   Musculoskeletal:         General: Normal range of motion.      Cervical back: Normal range of motion and neck supple.      Comments: Generalized weakness   Skin:      General: Skin is warm and dry.      Capillary Refill: Capillary refill takes 2 to 3 seconds.   Neurological:      General: No focal deficit present.      Mental Status: She is alert and oriented to person, place, and time. Mental status is at baseline.      Motor: Weakness present.   Psychiatric:         Mood and Affect: Mood normal.         Behavior: Behavior normal.         Thought Content: Thought content normal.         Judgment: Judgment normal.           Assessment:          * No active hospital problems. *    No past medical history on file.     Plan:        1. Acute hypoxic respiratory failure  2. Acute exacerbation COPD  3. Pulmonary Emboli  4. Chronic atrial fibrillation  5. Heart failure with preserved ejection fraction  6. Pulmonary fibrosis  7. Severe tracheomalacia  8. DVT s/p IVC filter placement  9. Leukocytosis - steroid induced?  10. Hypokalemia  11. Pulmonary hypertension     Continue current treatment. Monitor counts. Increase activity as tolerated. Labs Thursday. Oxygen weaning as tolerated. Maintain patient safety. Continue diuresis. Continue antiemetics and await KUB. Replace and recheck potassium.       Electronically signed by KD Luna on 1/2/2023 at 09:35 CST

## 2023-01-03 LAB
ANION GAP SERPL CALCULATED.3IONS-SCNC: 8 MMOL/L (ref 5–15)
BUN SERPL-MCNC: 29 MG/DL (ref 8–23)
BUN/CREAT SERPL: 43.9 (ref 7–25)
CALCIUM SPEC-SCNC: 9.1 MG/DL (ref 8.6–10.5)
CHLORIDE SERPL-SCNC: 96 MMOL/L (ref 98–107)
CO2 SERPL-SCNC: 32 MMOL/L (ref 22–29)
CREAT SERPL-MCNC: 0.66 MG/DL (ref 0.57–1)
EGFRCR SERPLBLD CKD-EPI 2021: 90.5 ML/MIN/1.73
GLUCOSE BLDC GLUCOMTR-MCNC: 132 MG/DL (ref 70–130)
GLUCOSE BLDC GLUCOMTR-MCNC: 138 MG/DL (ref 70–130)
GLUCOSE BLDC GLUCOMTR-MCNC: 184 MG/DL (ref 70–130)
GLUCOSE BLDC GLUCOMTR-MCNC: 198 MG/DL (ref 70–130)
GLUCOSE SERPL-MCNC: 145 MG/DL (ref 65–99)
MAGNESIUM SERPL-MCNC: 1.9 MG/DL (ref 1.6–2.4)
POTASSIUM SERPL-SCNC: 4.3 MMOL/L (ref 3.5–5.2)
SODIUM SERPL-SCNC: 136 MMOL/L (ref 136–145)

## 2023-01-03 PROCEDURE — 63710000001 PREDNISONE PER 5 MG: Performed by: NURSE PRACTITIONER

## 2023-01-03 PROCEDURE — 80048 BASIC METABOLIC PNL TOTAL CA: CPT | Performed by: INTERNAL MEDICINE

## 2023-01-03 PROCEDURE — 63710000001 LEVALBUTEROL PER 0.5 MG: Performed by: NURSE PRACTITIONER

## 2023-01-03 PROCEDURE — 63710000001 ONDANSETRON PER 8 MG: Performed by: INTERNAL MEDICINE

## 2023-01-03 PROCEDURE — 83735 ASSAY OF MAGNESIUM: CPT | Performed by: INTERNAL MEDICINE

## 2023-01-03 PROCEDURE — 97110 THERAPEUTIC EXERCISES: CPT

## 2023-01-03 PROCEDURE — 63710000001 INSULIN LISPRO (HUMAN) PER 5 UNITS: Performed by: INTERNAL MEDICINE

## 2023-01-03 PROCEDURE — 25010000002 ONDANSETRON PER 1 MG: Performed by: INTERNAL MEDICINE

## 2023-01-03 PROCEDURE — 97530 THERAPEUTIC ACTIVITIES: CPT

## 2023-01-03 PROCEDURE — 97535 SELF CARE MNGMENT TRAINING: CPT

## 2023-01-03 PROCEDURE — 97116 GAIT TRAINING THERAPY: CPT

## 2023-01-03 PROCEDURE — 99232 SBSQ HOSP IP/OBS MODERATE 35: CPT | Performed by: INTERNAL MEDICINE

## 2023-01-03 PROCEDURE — 82962 GLUCOSE BLOOD TEST: CPT

## 2023-01-03 RX ORDER — PREDNISONE 10 MG/1
10 TABLET ORAL DAILY
Status: DISCONTINUED | OUTPATIENT
Start: 2023-01-03 | End: 2023-01-06

## 2023-01-03 RX ORDER — POLYETHYLENE GLYCOL 3350 17 G/17G
17 POWDER, FOR SOLUTION ORAL DAILY
Status: DISCONTINUED | OUTPATIENT
Start: 2023-01-04 | End: 2023-01-06

## 2023-01-03 RX ORDER — DOCUSATE SODIUM 100 MG/1
100 CAPSULE, LIQUID FILLED ORAL 2 TIMES DAILY
Status: DISCONTINUED | OUTPATIENT
Start: 2023-01-03 | End: 2023-01-04 | Stop reason: ALTCHOICE

## 2023-01-03 RX ORDER — LEVALBUTEROL INHALATION SOLUTION 0.63 MG/3ML
0.63 SOLUTION RESPIRATORY (INHALATION)
Status: DISCONTINUED | OUTPATIENT
Start: 2023-01-03 | End: 2023-01-11 | Stop reason: ALTCHOICE

## 2023-01-03 NOTE — PROGRESS NOTES
FABBY Zendejas APRN        Internal Medicine Progress Note    1/3/2023   13:40 CST    Name:  Keisha Resendiz  MRN:    [id number]     Acct:     [id number]   Room:  [address] [place] [address] Day: 0     Admit Date: 12/29/2022  3:21 PM  PCP: Shannon Lilly PA-C    Subjective:     C/C: Oxygen weaning and rehabilitation efforts    Interval History: Status: Improved. Up to chair.  No family at bedside. Afebrile. Still with some intermittent nausea, but improved.  02 sats stable with 02 at 4 lpm. Potassium corrected. Moderate constipation noted on KUB.   Review of Systems   Constitutional: Negative for chills, decreased appetite, fever, malaise/fatigue, weight gain and weight loss.   HENT: Negative for congestion, ear discharge, ear pain, hoarse voice, sore throat and tinnitus.    Eyes: Negative for blurred vision, discharge, pain, visual disturbance and visual halos.   Cardiovascular: Positive for dyspnea on exertion and irregular heartbeat. Negative for chest pain, claudication, leg swelling, orthopnea and paroxysmal nocturnal dyspnea.   Respiratory: Positive for shortness of breath. Negative for cough, sputum production and wheezing.    Endocrine: Negative for cold intolerance, heat intolerance, polydipsia, polyphagia and polyuria.   Hematologic/Lymphatic: Negative for adenopathy. Does not bruise/bleed easily.   Skin: Negative for dry skin, flushing, itching, nail changes, rash and suspicious lesions.   Musculoskeletal: Positive for falls and muscle weakness. Negative for arthritis, back pain, joint pain and myalgias.   Gastrointestinal: Negative for bloating, abdominal pain, constipation, diarrhea, dysphagia and hematemesis.   Genitourinary: Negative for bladder incontinence, dysuria, flank pain and frequency.   Neurological: Positive for weakness. Negative for aphonia, difficulty with concentration, disturbances in coordination, dizziness and headaches.   Psychiatric/Behavioral:  Positive for altered mental status. Negative for depression, memory loss, substance abuse, suicidal ideas and thoughts of violence. The patient does not have insomnia and is not nervous/anxious.    Allergic/Immunologic: Negative for environmental allergies, HIV exposure, hives and persistent infections.         Medications:     Allergies:   Allergies   Allergen Reactions   • Clindamycin/Lincomycin Unknown - High Severity   • Keflex [Cephalexin] Unknown - High Severity   • Penicillins Unknown - High Severity   • Sulfa Antibiotics Unknown - High Severity       Current Meds:   Current Facility-Administered Medications:   •  acetaminophen (TYLENOL) tablet 650 mg, 650 mg, Oral, Q4H PRN **OR** acetaminophen (TYLENOL) suppository 650 mg, 650 mg, Rectal, Q4H PRN, Jose Alfredo Mcbride MD  •  ALPRAZolam (XANAX) tablet 0.5 mg, 0.5 mg, Oral, BID PRN, Jose Alfredo Mcbride MD  •  apixaban (ELIQUIS) tablet 5 mg, 5 mg, Oral, Q12H, Jose Alfredo Mcbride MD  •  atorvastatin (LIPITOR) tablet 20 mg, 20 mg, Oral, Nightly, Jose Alfredo Mcbride MD  •  dextrose (D50W) (25 g/50 mL) IV injection 25 g, 25 g, Intravenous, Q15 Min PRN, Jose Alfredo Mcbride MD  •  dextrose (GLUTOSE) oral gel 15 g, 15 g, Oral, Q15 Min PRN, Jose Alfredo Mcbride MD  •  dilTIAZem (CARDIZEM) 125 mg in 125 mL NS infusion, 5-15 mg/hr, Intravenous, Titrated, Jose Alfredo Mcbride MD  •  docusate sodium (COLACE) capsule 100 mg, 100 mg, Oral, BID PRN, Jose Alfredo Mcbride MD  •  flecainide (TAMBOCOR) tablet 100 mg, 100 mg, Oral, Q12H, Jose Alfredo Mcbride MD  •  glucagon (human recombinant) (GLUCAGEN DIAGNOSTIC) injection 1 mg, 1 mg, Subcutaneous, Q15 Min PRN, Jose Alfredo Mcbride MD  •  guaiFENesin (MUCINEX) 12 hr tablet 1,200 mg, 1,200 mg, Oral, Q12H, Jose Alfredo Mcbride MD  •  hydrALAZINE (APRESOLINE) tablet 25 mg, 25 mg, Oral, Q8H, Jose Alfredo Mcbride MD  •  HYDROcod Polst-CPM Polst ER ([facility]) 10-8 MG/5ML ER suspension 5 mL, 5  mL, Oral, Q12H PRN, Jose Alfredo Mcbride MD  •  Insulin Lispro (humaLOG) injection 2-7 Units, 2-7 Units, Subcutaneous, 4x Daily AC & at Bedtime, Jose Alfredo Mcbride MD  •  ipratropium (ATROVENT) nebulizer solution 0.5 mg, 0.5 mg, Nebulization, TID - RT, Jose Alfredo Mcbride MD  •  levalbuterol (XOPENEX) nebulizer solution 0.63 mg, 0.63 mg, Nebulization, TID - RT, Osiris Jackson, APRN  •  losartan (COZAAR) tablet 100 mg, 100 mg, Oral, Q24H, Jose Alfredo Mcbride MD  •  metoprolol succinate XL (TOPROL-XL) 24 hr tablet 25 mg, 25 mg, Oral, Q24H, Jose Alfredo Mcbride MD  •  metoprolol tartrate (LOPRESSOR) injection 5 mg, 5 mg, Intravenous, Q4H PRN, Jose Alfredo Mcbride MD  •  montelukast (SINGULAIR) tablet 10 mg, 10 mg, Oral, Nightly, Jose Alfredo Mcbride MD  •  nystatin (MYCOSTATIN) powder, , Topical, Q8H, Jose Alfredo Mcbride MD  •  nystatin (MYCOSTATIN) powder, , Topical, Q12H, Jose Alfredo Mcbride MD  •  nystatin (MYCOSTATIN) powder, , Topical, BID PRN, Jose Alfredo Mcbride MD  •  ondansetron (ZOFRAN) tablet 4 mg, 4 mg, Oral, Q6H PRN **OR** ondansetron (ZOFRAN) injection 4 mg, 4 mg, Intravenous, Q6H PRN, Jose Alfredo Mcbride MD  •  pantoprazole (PROTONIX) EC tablet 40 mg, 40 mg, Oral, BID AC, Jose Alfredo Mcbride MD  •  polyethylene glycol (MIRALAX) packet 17 g, 17 g, Oral, Daily PRN, Alejandro Batista MD  •  potassium chloride (MICRO-K) CR capsule 20 mEq, 20 mEq, Oral, Daily, Jose Alfredo Mcbride MD  •  predniSONE (DELTASONE) tablet 10 mg, 10 mg, Oral, Daily, Osiris Jackson, APRN  •  sodium chloride 0.9 % flush 10 mL, 10 mL, Intravenous, Q12H, Jose Alfredo Mcbride MD  •  sodium chloride 0.9 % flush 10 mL, 10 mL, Intravenous, PRN, Jose Alfredo Mcbride MD  •  torsemide (DEMADEX) tablet 20 mg, 20 mg, Oral, Daily, Jose Alfredo Mcbride MD  •  zolpidem (AMBIEN) tablet 5 mg, 5 mg, Oral, Nightly, Jos eAlfredo Mcbride MD    Data:     Code Status:    There are no questions and answers  "to display.       No family history on file.    Social History     Socioeconomic History   • Marital status:        Vitals:  Ht 162.6 cm (64\")   Wt 118 kg (259 lb 3.2 oz) Comment: Charge nurse says this wt is accurate  BMI 44.49 kg/m²   T 98.0 HR 71 RR 19 /64 SPO2 98% (4 lpm)          I/O (24Hr):  No intake or output data in the 24 hours ending 01/03/23 1340    Labs and imaging:      Recent Results (from the past 12 hour(s))   Basic Metabolic Panel    Collection Time: 01/03/23  4:50 AM    Specimen: Blood   Result Value Ref Range    Glucose 145 (H) 65 - 99 mg/dL    BUN 29 (H) 8 - 23 mg/dL    Creatinine 0.66 0.57 - 1.00 mg/dL    Sodium 136 136 - 145 mmol/L    Potassium 4.3 3.5 - 5.2 mmol/L    Chloride 96 (L) 98 - 107 mmol/L    CO2 32.0 (H) 22.0 - 29.0 mmol/L    Calcium 9.1 8.6 - 10.5 mg/dL    BUN/Creatinine Ratio 43.9 (H) 7.0 - 25.0    Anion Gap 8.0 5.0 - 15.0 mmol/L    eGFR 90.5 >60.0 mL/min/1.73   Magnesium    Collection Time: 01/03/23  4:50 AM    Specimen: Blood   Result Value Ref Range    Magnesium 1.9 1.6 - 2.4 mg/dL   POC Glucose Once    Collection Time: 01/03/23  7:09 AM    Specimen: Blood   Result Value Ref Range    Glucose 132 (H) 70 - 130 mg/dL   POC Glucose Once    Collection Time: 01/03/23 11:12 AM    Specimen: Blood   Result Value Ref Range    Glucose 138 (H) 70 - 130 mg/dL         Physical Examination:        Physical Exam  Vitals and nursing note reviewed.   Constitutional:       Appearance: Normal appearance. She is obese.   HENT:      Head: Normocephalic and atraumatic.      Right Ear: External ear normal.      Left Ear: External ear normal.      Nose: Nose normal.      Mouth/Throat:      Mouth: Mucous membranes are moist.      Pharynx: Oropharynx is clear.   Eyes:      Extraocular Movements: Extraocular movements intact.      Conjunctiva/sclera: Conjunctivae normal.      Pupils: Pupils are equal, round, and reactive to light.   Cardiovascular:      Rate and Rhythm: Normal rate. Rhythm " irregular.      Pulses: Normal pulses.      Heart sounds: Normal heart sounds.   Pulmonary:      Effort: Pulmonary effort is normal.      Breath sounds: Normal breath sounds.   Abdominal:      General: Bowel sounds are normal.      Palpations: Abdomen is soft.   Musculoskeletal:         General: Normal range of motion.      Cervical back: Normal range of motion and neck supple.      Comments: Generalized weakness   Skin:     General: Skin is warm and dry.      Capillary Refill: Capillary refill takes 2 to 3 seconds.   Neurological:      General: No focal deficit present.      Mental Status: She is alert and oriented to person, place, and time. Mental status is at baseline.      Motor: Weakness present.   Psychiatric:         Mood and Affect: Mood normal.         Behavior: Behavior normal.         Thought Content: Thought content normal.         Judgment: Judgment normal.           Assessment:          * No active hospital problems. *    No past medical history on file.     Plan:        1. Acute hypoxic respiratory failure  2. Acute exacerbation COPD  3. Pulmonary Emboli  4. Chronic atrial fibrillation  5. Heart failure with preserved ejection fraction  6. Pulmonary fibrosis  7. Severe tracheomalacia  8. DVT s/p IVC filter placement  9. Leukocytosis - steroid induced?  10. Hypokalemia  11. Pulmonary hypertension     Continue current treatment. Monitor counts. Increase activity as tolerated. Labs Thursday. Oxygen weaning as tolerated. Maintain patient safety. Continue diuresis. Continue bowel regimen - add miralax and colace.       Electronically signed by KD Luna on 1/3/2023 at 13:40 CST

## 2023-01-03 NOTE — PROGRESS NOTES
[facility] PULMONARY AND CRITICAL CARE PROGRESS NOTE - [facility]  Patient: Keisha Resendiz    1945   Acct# [id number]  01/03/23   07:03 CST  Referring Provider: Jose Alfredo Mcbride MD  Chief Complaint: Shortness of breath  Interval history: Afebrile.  Saturation 94 on 4 L.  She indicates her breathing is better today.  All nebs adjusted to 3 times daily for simplicity.  Discussed with RT.  Abdominal work-up yesterday identifying constipation. Colace and MiraLAX added.  She reports she has not had a BM since she was admitted.  Order placed for fleets enema today.  Prednisone reduced today to 10 mg daily and taper off.    Physical Exam:  Vital signs: T: 98.0   BP: 127/64   P: 85   R: 18   sat: 94 on 4 L  Physical Exam   Constitutional:       General: She is not in acute distress.     Appearance: She is obese. She is ill-appearing.      Interventions: Nasal cannula in place.   HENT:      Head: Normocephalic.      Nose: Nose normal.      Mouth/Throat:      Mouth: Mucous membranes are moist.   Eyes:      General: No scleral icterus.  Cardiovascular:      Rate and Rhythm: Normal rate.   Pulmonary:      Effort: No respiratory distress.      Breath sounds: Decreased breath sounds present.   Abdominal:      General: There is no distension. + BS. Non-tender to palpation   Musculoskeletal:      Right lower leg: Edema present.      Left lower leg: Edema present.   Skin:     Findings: Bruising present.      Comments: Chronic venous stasis changes to ble    Neurological:      Mental Status: She is alert and oriented to person, place, and time.   Psychiatric:         Mood and Affect: Mood normal.         Behavior: Behavior is cooperative.     Electronically signed by KD White, 1/3/2023, 07:03 CST   Physician substantive portion: medical decision making    Result Review    Laboratory Data:  Results from last 7 days   Lab Units 01/02/23  0951 01/02/23  0511 12/30/22  0408   WBC 10*3/mm3 18.39*  19.70* 12.53*   HEMOGLOBIN g/dL 15.0 15.4 14.8   PLATELETS 10*3/mm3 253 272 245     Results from last 7 days   Lab Units 01/03/23  0450 01/02/23  0951 01/02/23  0511   SODIUM mmol/L 136 136 139   POTASSIUM mmol/L 4.3 3.3* 3.4*   CO2 mmol/L 32.0* 32.0* 30.0*   BUN mg/dL 29* 36* 39*   CREATININE mg/dL 0.66 0.64 0.64   CRP mg/dL  --   --  5.84*           Recent films:  XR Abdomen KUB    Result Date: 1/2/2023  EXAMINATION: XR ABDOMEN KUB- 1/2/2023 10:38 AM CST  HISTORY: NAUSEA, EPIGASTRIC PAIN.  REPORT: A supine view of the abdomen was obtained.  COMPARISON: There are no correlative imaging studies for comparison.  A large amount of stool seen throughout the colon, no bowel dilation or evidence of obstruction is identified. Cholecystectomy clips are present. There is a surgical staple line over the midline pelvis. Small amounts of barium were dense material are noted within the splenic flexure of colon and cecum. Diffuse degenerative changes of the lumbar spine. No acute osseous abnormality.      Impression: Moderate constipation. This report was finalized on 01/02/2023 10:39 by Dr. Jules Ceballos MD.         Pulmonary Assessment:  1. Chronic Obstructive pulmonary disease-acute exacerbation better  2. Acute respiratory failure with hypoxia better  3. Weakness and deconditioning    Recommend/plan:   · Continue PT  · Continue bronchodilators  · Taper steroids  · Will reassess in am; may be ready for sign off if no new issues as predominant need now is rehabilitation.    This visit was performed by both a physician and an Advanced Practice RN.  I personally evaluated and examined the patient.  I performed all aspects of the medical decision making as documented.  Electronically signed by Alejandro Batista MD, 1/3/2023, 10:31 CST

## 2023-01-04 ENCOUNTER — APPOINTMENT (OUTPATIENT)
Dept: GENERAL RADIOLOGY | Facility: HOSPITAL | Age: 78
End: 2023-01-04
Payer: COMMERCIAL

## 2023-01-04 LAB
GLUCOSE BLDC GLUCOMTR-MCNC: 121 MG/DL (ref 70–130)
GLUCOSE BLDC GLUCOMTR-MCNC: 131 MG/DL (ref 70–130)
GLUCOSE BLDC GLUCOMTR-MCNC: 142 MG/DL (ref 70–130)
GLUCOSE BLDC GLUCOMTR-MCNC: 241 MG/DL (ref 70–130)

## 2023-01-04 PROCEDURE — 73552 X-RAY EXAM OF FEMUR 2/>: CPT

## 2023-01-04 PROCEDURE — 82962 GLUCOSE BLOOD TEST: CPT

## 2023-01-04 PROCEDURE — 25010000002 ONDANSETRON PER 1 MG: Performed by: INTERNAL MEDICINE

## 2023-01-04 PROCEDURE — 63710000001 ONDANSETRON PER 8 MG: Performed by: INTERNAL MEDICINE

## 2023-01-04 PROCEDURE — 73502 X-RAY EXAM HIP UNI 2-3 VIEWS: CPT

## 2023-01-04 PROCEDURE — 63710000001 LEVALBUTEROL PER 0.5 MG: Performed by: NURSE PRACTITIONER

## 2023-01-04 PROCEDURE — 63710000001 PREDNISONE PER 5 MG: Performed by: NURSE PRACTITIONER

## 2023-01-04 PROCEDURE — 99231 SBSQ HOSP IP/OBS SF/LOW 25: CPT | Performed by: INTERNAL MEDICINE

## 2023-01-04 PROCEDURE — 63710000001 INSULIN LISPRO (HUMAN) PER 5 UNITS: Performed by: INTERNAL MEDICINE

## 2023-01-04 RX ORDER — AMOXICILLIN 250 MG
2 CAPSULE ORAL 2 TIMES DAILY
Status: DISCONTINUED | OUTPATIENT
Start: 2023-01-04 | End: 2023-01-16 | Stop reason: HOSPADM

## 2023-01-04 RX ORDER — ZOLPIDEM TARTRATE 5 MG/1
5 TABLET ORAL NIGHTLY PRN
Status: DISCONTINUED | OUTPATIENT
Start: 2023-01-04 | End: 2023-01-16 | Stop reason: HOSPADM

## 2023-01-04 RX ORDER — TROLAMINE SALICYLATE 10 G/100G
1 CREAM TOPICAL AS NEEDED
Status: DISCONTINUED | OUTPATIENT
Start: 2023-01-04 | End: 2023-01-16 | Stop reason: HOSPADM

## 2023-01-04 NOTE — PROGRESS NOTES
[facility]  FABBY Sung APRN        Internal Medicine Progress Note    1/4/2023   10:55 CST    Name:  Keisha Resendiz  MRN:    [id number]     Acct:     [id number]   Room:  [address] [place] [address] Day: 0     Admit Date: 12/29/2022  3:21 PM  PCP: Shannon Lilly PA-C    Subjective:     C/C: Oxygen weaning and rehabilitation efforts    Interval History: Status: Improved. Resting in bed.  No family at bedside. Afebrile. Still with some intermittent nausea, but improved.  02 sats stable with 02 at 4 lpm. C/o increased pain to right hip and femur.   Review of Systems   Constitutional: Negative for chills, decreased appetite, fever, malaise/fatigue, weight gain and weight loss.   HENT: Negative for congestion, ear discharge, ear pain, hoarse voice, sore throat and tinnitus.    Eyes: Negative for blurred vision, discharge, pain, visual disturbance and visual halos.   Cardiovascular: Positive for dyspnea on exertion and irregular heartbeat. Negative for chest pain, claudication, leg swelling, orthopnea and paroxysmal nocturnal dyspnea.   Respiratory: Positive for shortness of breath. Negative for cough, sputum production and wheezing.    Endocrine: Negative for cold intolerance, heat intolerance, polydipsia, polyphagia and polyuria.   Hematologic/Lymphatic: Negative for adenopathy. Does not bruise/bleed easily.   Skin: Negative for dry skin, flushing, itching, nail changes, rash and suspicious lesions.   Musculoskeletal: Positive for falls and muscle weakness. Negative for arthritis, back pain, joint pain and myalgias.   Gastrointestinal: Negative for bloating, abdominal pain, constipation, diarrhea, dysphagia and hematemesis.   Genitourinary: Negative for bladder incontinence, dysuria, flank pain and frequency.   Neurological: Positive for weakness. Negative for aphonia, difficulty with concentration, disturbances in coordination, dizziness and headaches.   Psychiatric/Behavioral: Positive for  altered mental status. Negative for depression, memory loss, substance abuse, suicidal ideas and thoughts of violence. The patient does not have insomnia and is not nervous/anxious.    Allergic/Immunologic: Negative for environmental allergies, HIV exposure, hives and persistent infections.         Medications:     Allergies:   Allergies   Allergen Reactions   • Clindamycin/Lincomycin Unknown - High Severity   • Keflex [Cephalexin] Unknown - High Severity   • Penicillins Unknown - High Severity   • Sulfa Antibiotics Unknown - High Severity       Current Meds:   Current Facility-Administered Medications:   •  acetaminophen (TYLENOL) tablet 650 mg, 650 mg, Oral, Q4H PRN **OR** acetaminophen (TYLENOL) suppository 650 mg, 650 mg, Rectal, Q4H PRN, Jose Alfredo Mcbride MD  •  ALPRAZolam (XANAX) tablet 0.5 mg, 0.5 mg, Oral, BID PRN, Jose Alfredo Mcbride MD  •  apixaban (ELIQUIS) tablet 5 mg, 5 mg, Oral, Q12H, Jose Alfredo Mcbride MD  •  atorvastatin (LIPITOR) tablet 20 mg, 20 mg, Oral, Nightly, Jose Alfredo Mcbride MD  •  dextrose (D50W) (25 g/50 mL) IV injection 25 g, 25 g, Intravenous, Q15 Min PRN, Jose Alfredo Mcbride MD  •  dextrose (GLUTOSE) oral gel 15 g, 15 g, Oral, Q15 Min PRN, Jose Alfredo Mcbride MD  •  dilTIAZem (CARDIZEM) 125 mg in 125 mL NS infusion, 5-15 mg/hr, Intravenous, Titrated, Jose Alfredo Mcbride MD  •  docusate sodium (COLACE) capsule 100 mg, 100 mg, Oral, BID PRN, Jose Alfredo Mcbride MD  •  docusate sodium (COLACE) capsule 100 mg, 100 mg, Oral, BID, Jose Alfredo Mcbride MD  •  flecainide (TAMBOCOR) tablet 100 mg, 100 mg, Oral, Q12H, Jose Alfredo Mcbride MD  •  glucagon (human recombinant) (GLUCAGEN DIAGNOSTIC) injection 1 mg, 1 mg, Subcutaneous, Q15 Min PRN, Jose Alfredo Mcbride MD  •  guaiFENesin (MUCINEX) 12 hr tablet 1,200 mg, 1,200 mg, Oral, Q12H, Jose Alfredo Mcbride MD  •  hydrALAZINE (APRESOLINE) tablet 25 mg, 25 mg, Oral, Q8H, Jose Alfredo Mcbride MD  •  HYDROcod  Polst-CPM Polst ER (TUSSIONEX PENNKINETIC) 10-8 MG/5ML ER suspension 5 mL, 5 mL, Oral, Q12H PRN, Jose Alfredo Mcbride MD  •  Insulin Lispro (humaLOG) injection 2-7 Units, 2-7 Units, Subcutaneous, 4x Daily AC & at Bedtime, Jose Alfredo Mcbride MD  •  ipratropium (ATROVENT) nebulizer solution 0.5 mg, 0.5 mg, Nebulization, TID - RT, Jose Alfredo Mcbride MD  •  levalbuterol (XOPENEX) nebulizer solution 0.63 mg, 0.63 mg, Nebulization, TID - RT, Osiris Jackson APRN  •  losartan (COZAAR) tablet 100 mg, 100 mg, Oral, Q24H, Jose Alfredo Mcbride MD  •  metoprolol succinate XL (TOPROL-XL) 24 hr tablet 25 mg, 25 mg, Oral, Q24H, Jose Alfredo Mcbride MD  •  metoprolol tartrate (LOPRESSOR) injection 5 mg, 5 mg, Intravenous, Q4H PRN, Jose Alfredo Mcbride MD  •  montelukast (SINGULAIR) tablet 10 mg, 10 mg, Oral, Nightly, Jose Alfredo Mcbride MD  •  nystatin (MYCOSTATIN) powder, , Topical, Q8H, Jose Alfredo Mcbride MD  •  nystatin (MYCOSTATIN) powder, , Topical, Q12H, Jose Alfredo Mcbride MD  •  nystatin (MYCOSTATIN) powder, , Topical, BID PRN, Jose Alfredo Mcbride MD  •  ondansetron (ZOFRAN) tablet 4 mg, 4 mg, Oral, Q6H PRN **OR** ondansetron (ZOFRAN) injection 4 mg, 4 mg, Intravenous, Q6H PRN, Jose Alfredo Mcbride MD  •  pantoprazole (PROTONIX) EC tablet 40 mg, 40 mg, Oral, BID AC, Jose Alfredo Mcbride MD  •  polyethylene glycol (MIRALAX) packet 17 g, 17 g, Oral, Daily, Jose Alfredo Mcbride MD  •  potassium chloride (MICRO-K) CR capsule 20 mEq, 20 mEq, Oral, Daily, Jose Alfredo Mcbride MD  •  predniSONE (DELTASONE) tablet 10 mg, 10 mg, Oral, Daily, Manuel, Osiris M, APRN  •  sodium chloride 0.9 % flush 10 mL, 10 mL, Intravenous, Q12H, Jose Alfredo Mcbride MD  •  sodium chloride 0.9 % flush 10 mL, 10 mL, Intravenous, PRN, Jose Alfredo Mcbride MD  •  torsemide (DEMADEX) tablet 20 mg, 20 mg, Oral, Daily, Jose Alfredo Mcbride MD  •  zolpidem (AMBIEN) tablet 5 mg, 5 mg, Oral, Nightly, Jose Alfredo Mcbride  "MD Raoul    Data:     Code Status:    There are no questions and answers to display.       No family history on file.    Social History     Socioeconomic History   • Marital status:        Vitals:  Ht 162.6 cm (64\")   Wt 118 kg (259 lb 3.2 oz) Comment: Charge nurse says this wt is accurate  BMI 44.49 kg/m²   T 98.0  RR 18 /52 SPO2 98% (4 lpm)          I/O (24Hr):  No intake or output data in the 24 hours ending 01/04/23 1055    Labs and imaging:      Recent Results (from the past 12 hour(s))   POC Glucose Once    Collection Time: 01/04/23  7:33 AM    Specimen: Blood   Result Value Ref Range    Glucose 131 (H) 70 - 130 mg/dL         Physical Examination:        Physical Exam  Vitals and nursing note reviewed.   Constitutional:       Appearance: Normal appearance. She is obese.   HENT:      Head: Normocephalic and atraumatic.      Right Ear: External ear normal.      Left Ear: External ear normal.      Nose: Nose normal.      Mouth/Throat:      Mouth: Mucous membranes are moist.      Pharynx: Oropharynx is clear.   Eyes:      Extraocular Movements: Extraocular movements intact.      Conjunctiva/sclera: Conjunctivae normal.      Pupils: Pupils are equal, round, and reactive to light.   Cardiovascular:      Rate and Rhythm: Normal rate. Rhythm irregular.      Pulses: Normal pulses.      Heart sounds: Normal heart sounds.   Pulmonary:      Effort: Pulmonary effort is normal.      Breath sounds: Normal breath sounds.   Abdominal:      General: Bowel sounds are normal. There is distension.      Palpations: Abdomen is soft.   Musculoskeletal:         General: Normal range of motion.      Cervical back: Normal range of motion and neck supple.      Comments: Generalized weakness   Skin:     General: Skin is warm and dry.      Capillary Refill: Capillary refill takes 2 to 3 seconds.   Neurological:      General: No focal deficit present.      Mental Status: She is alert and oriented to person, place, and " time. Mental status is at baseline.      Motor: Weakness present.   Psychiatric:         Mood and Affect: Mood normal.         Behavior: Behavior normal.         Thought Content: Thought content normal.         Judgment: Judgment normal.           Assessment:          * No active hospital problems. *    No past medical history on file.     Plan:        1. Acute hypoxic respiratory failure  2. Acute exacerbation COPD  3. Pulmonary Emboli  4. Chronic atrial fibrillation  5. Heart failure with preserved ejection fraction  6. Pulmonary fibrosis  7. Severe tracheomalacia  8. DVT s/p IVC filter placement  9. Leukocytosis - steroid induced?  10. Hypokalemia  11. Pulmonary hypertension     Continue current treatment. Monitor counts. Increase activity as tolerated. Labs in am. Oxygen weaning as tolerated. Maintain patient safety. Continue diuresis. Continue bowel regimen - add pericolace. XR right hip and femur and add aspercreme prn.       Electronically signed by KD Luna on 1/4/2023 at 10:55 CST     I have discussed the care of Keisha Resendiz, including pertinent history and exam findings, with the nurse practitioner.    I have seen and examined the patient and the key elements of all parts of the encounter have been performed by me.  I agree with the assessment, plan and orders as documented by KD Guillen, after I modified the exam findings and the plan of treatments and the final version is my approved version of the assessment.        Electronically signed by Jose Alfredo Mcbride MD on 1/4/2023 at 12:16 CST

## 2023-01-04 NOTE — PROGRESS NOTES
[facility] PULMONARY AND CRITICAL CARE PROGRESS NOTE - [facility]  Patient: Keisha Resendiz    1945   Acct# [id number]  01/04/23   06:54 CST  Referring Provider: Jose Alfredo Mcbride MD  Chief Complaint: Shortness of breath  Interval history: Afebrile.  Saturation 92 on 4L which is her baseline.  Breathing stable.  Bronchodilators 3 times daily.  Prednisone being tapered off.  She has had ongoing issues with abdominal discomfort, nausea and constipation.  She has not had a BM.  Discussed with nursing.  Provide enema ordered yesterday if no bowel movement later today with oral medication.  Physical Exam:  Vital signs: T: 97.6   BP: 94/56   P: 84   R: 18   sat: 92 on 4 L  Physical Exam  Constitutional:       General: She is not in acute distress.     Appearance: She is obese. She is ill-appearing.      Interventions: Nasal cannula in place.   HENT:      Head: Normocephalic.      Nose: Nose normal.      Mouth/Throat:      Mouth: Mucous membranes are moist.   Eyes:      General: No scleral icterus.  Cardiovascular:      Rate and Rhythm: Normal rate.   Pulmonary:      Effort: No respiratory distress.      Breath sounds: Decreased breath sounds present.   Abdominal:      General: There is no distension. + BS. Non-tender to palpation   Musculoskeletal:      Right lower leg: Edema present.      Left lower leg: Edema present.   Skin:     Findings: Bruising present.      Comments: Chronic venous stasis changes    Neurological:      Mental Status: She is alert and oriented to person, place, and time.   Psychiatric:         Mood and Affect: Mood normal.         Behavior: Behavior is cooperative.     Electronically signed by KD White, 1/4/2023, 06:54 CST      Physician substantive portion: medical decision making    Result Review    Laboratory Data:  Results from last 7 days   Lab Units 01/05/23  0519 01/02/23  0951 01/02/23  0511   WBC 10*3/mm3 12.15* 18.39* 19.70*   HEMOGLOBIN g/dL 13.0 15.0  15.4   PLATELETS 10*3/mm3 228 253 272     Results from last 7 days   Lab Units 01/05/23  0519 01/03/23  0450 01/02/23  0951 01/02/23  0511   SODIUM mmol/L 134* 136 136 139   POTASSIUM mmol/L 3.6 4.3 3.3* 3.4*   CO2 mmol/L 33.0* 32.0* 32.0* 30.0*   BUN mg/dL 25* 29* 36* 39*   CREATININE mg/dL 0.71 0.66 0.64 0.64   CRP mg/dL  --   --   --  5.84*         Microbiology Results (last 10 days)     ** No results found for the last 240 hours. **         Recent films:  XR Femur 2 View Left    Result Date: 1/4/2023  EXAMINATION: XR FEMUR 2 VW LEFT- 1/4/2023 3:45 PM CST  HISTORY: pain  REPORT: 2 views of the left femur were obtained.  COMPARISON: There are no correlative imaging studies for comparison.  Osseous alignment is normal, no fracture is identified. There is mild degenerative narrowing of the left hip joint space. The joint spaces at the knee appear preserved. No soft tissue foreign body or gas is identified. There is no definite knee joint effusion.      Impression: No acute osseous abnormality. This report was finalized on 01/04/2023 15:46 by Dr. Jules Ceballos MD.    XR Femur 2 View Right    Result Date: 1/4/2023  EXAMINATION: XR FEMUR 2 VW RIGHT- 1/4/2023 3:43 PM CST  HISTORY: pain  REPORT: 2 views of the right femur were obtained.  COMPARISON: There are no correlative imaging studies for comparison.  Osseous alignment is normal, no fracture is identified. The joint spaces at the hip and knee are preserved. There is a small degenerative spur at the lateral femoral head. No soft tissue abnormality is identified.      Impression: No acute osseous abnormality. This report was finalized on 01/04/2023 15:44 by Dr. Jules Ceballos MD.    XR Abdomen KUB    Result Date: 1/5/2023  EXAMINATION: XR ABDOMEN KUB-  1/5/2023 6:50 AM CST  HISTORY: constipation  A frontal projection of the abdomen is compared with the previous study dated 01/02/2023.  There is moderate gas and stool in the colon which has somewhat decreased in  since the previous study.  Both kidneys are obscured by the bowel contents. No radiopaque calculi.  There is evidence of prior cholecystectomy. Surgical staples are seen projected over the lower abdomen and pelvis is present a recent surgery.  Soft tissue calcification is seen in the gluteal area lower back representing previous trauma or subcutaneous/intramuscular injections.  No acute bony abnormality.      Impression: 1. Moderate volume stool in the colon which have decreased since the previous study. No finding to suggest obstruction. 2. No radiopaque calculi. This report was finalized on 01/05/2023 07:39 by Dr. Darnell Mukherjee MD.    XR Hip With or Without Pelvis 2 - 3 View Left    Result Date: 1/4/2023  EXAM/TECHNIQUE: XR HIP W OR WO PELVIS 2-3 VIEW LEFT-  INDICATION: pain  COMPARISON: None available.  FINDINGS:  Pelvis: Mild degenerative change in the pubic symphysis. Sacral arcuate lines are symmetric. No evidence of acute sacral fracture. No pelvic fracture identified. Multiple surgical clips project over the mid pelvis.  LEFT hip: No acute fracture or dislocation. Mild LEFT hip osteoarthritis characterized by mild acetabular osteophyte the abdomen. No suspicious bone lesion. No radiopaque foreign body or soft tissue gas.      Impression:  No acute osseous findings. Mild degenerative change in the LEFT hip. This report was finalized on 01/04/2023 16:30 by Dr. Raoul Sands MD.    XR Hip With or Without Pelvis 2 - 3 View Right    Result Date: 1/4/2023  EXAMINATION: XR HIP W OR WO PELVIS 2-3 VIEW RIGHT- 1/4/2023 3:46 PM CST  HISTORY: pain  REPORT: 2 views of the right hip were obtained.  COMPARISON: There are no correlative imaging studies for comparison.  There is mild narrowing of the right hip joint space. A small spurs noted at the lateral femoral head. There slight spurring of the acetabulum. No fracture is identified. No focal lytic or sclerotic bone lesion is detected. The soft tissues appear within  normal limits.      Impression: Mild degenerative changes of the right hip. No acute abnormality. This report was finalized on 01/04/2023 15:47 by Dr. Jules Ceballos MD.         Pulmonary Assessment:  1. Copd ae improved  2. Acute resp failure with hypoxia back to baseline    Recommend/plan:   · Continue PT and rehab efforts  · Signing off; follow up as needed.      This visit was performed by both a physician and an Advanced Practice RN.  I personally evaluated and examined the patient.  I performed all aspects of the medical decision making as documented.  Electronically signed by Alejandro Batista MD, 1/5/2023, 16:00 CST

## 2023-01-05 ENCOUNTER — APPOINTMENT (OUTPATIENT)
Dept: GENERAL RADIOLOGY | Facility: HOSPITAL | Age: 78
End: 2023-01-05
Payer: COMMERCIAL

## 2023-01-05 LAB
ANION GAP SERPL CALCULATED.3IONS-SCNC: 7 MMOL/L (ref 5–15)
BASOPHILS # BLD AUTO: 0.04 10*3/MM3 (ref 0–0.2)
BASOPHILS NFR BLD AUTO: 0.3 % (ref 0–1.5)
BUN SERPL-MCNC: 25 MG/DL (ref 8–23)
BUN/CREAT SERPL: 35.2 (ref 7–25)
CALCIUM SPEC-SCNC: 8.3 MG/DL (ref 8.6–10.5)
CHLORIDE SERPL-SCNC: 94 MMOL/L (ref 98–107)
CO2 SERPL-SCNC: 33 MMOL/L (ref 22–29)
CREAT SERPL-MCNC: 0.71 MG/DL (ref 0.57–1)
DEPRECATED RDW RBC AUTO: 53.8 FL (ref 37–54)
EGFRCR SERPLBLD CKD-EPI 2021: 87.7 ML/MIN/1.73
EOSINOPHIL # BLD AUTO: 0.22 10*3/MM3 (ref 0–0.4)
EOSINOPHIL NFR BLD AUTO: 1.8 % (ref 0.3–6.2)
ERYTHROCYTE [DISTWIDTH] IN BLOOD BY AUTOMATED COUNT: 15.9 % (ref 12.3–15.4)
GLUCOSE BLDC GLUCOMTR-MCNC: 107 MG/DL (ref 70–130)
GLUCOSE BLDC GLUCOMTR-MCNC: 138 MG/DL (ref 70–130)
GLUCOSE BLDC GLUCOMTR-MCNC: 141 MG/DL (ref 70–130)
GLUCOSE BLDC GLUCOMTR-MCNC: 157 MG/DL (ref 70–130)
GLUCOSE SERPL-MCNC: 113 MG/DL (ref 65–99)
HCT VFR BLD AUTO: 41.3 % (ref 34–46.6)
HGB BLD-MCNC: 13 G/DL (ref 12–15.9)
IMM GRANULOCYTES # BLD AUTO: 0.2 10*3/MM3 (ref 0–0.05)
IMM GRANULOCYTES NFR BLD AUTO: 1.6 % (ref 0–0.5)
LYMPHOCYTES # BLD AUTO: 2.11 10*3/MM3 (ref 0.7–3.1)
LYMPHOCYTES NFR BLD AUTO: 17.4 % (ref 19.6–45.3)
MCH RBC QN AUTO: 28.6 PG (ref 26.6–33)
MCHC RBC AUTO-ENTMCNC: 31.5 G/DL (ref 31.5–35.7)
MCV RBC AUTO: 90.8 FL (ref 79–97)
MONOCYTES # BLD AUTO: 0.81 10*3/MM3 (ref 0.1–0.9)
MONOCYTES NFR BLD AUTO: 6.7 % (ref 5–12)
NEUTROPHILS NFR BLD AUTO: 72.2 % (ref 42.7–76)
NEUTROPHILS NFR BLD AUTO: 8.77 10*3/MM3 (ref 1.7–7)
NRBC BLD AUTO-RTO: 0 /100 WBC (ref 0–0.2)
PLATELET # BLD AUTO: 228 10*3/MM3 (ref 140–450)
PMV BLD AUTO: 11.5 FL (ref 6–12)
POTASSIUM SERPL-SCNC: 3.6 MMOL/L (ref 3.5–5.2)
RBC # BLD AUTO: 4.55 10*6/MM3 (ref 3.77–5.28)
SODIUM SERPL-SCNC: 134 MMOL/L (ref 136–145)
WBC NRBC COR # BLD: 12.15 10*3/MM3 (ref 3.4–10.8)

## 2023-01-05 PROCEDURE — 25010000002 ONDANSETRON PER 1 MG: Performed by: INTERNAL MEDICINE

## 2023-01-05 PROCEDURE — 97116 GAIT TRAINING THERAPY: CPT

## 2023-01-05 PROCEDURE — 74018 RADEX ABDOMEN 1 VIEW: CPT

## 2023-01-05 PROCEDURE — 97530 THERAPEUTIC ACTIVITIES: CPT

## 2023-01-05 PROCEDURE — 80048 BASIC METABOLIC PNL TOTAL CA: CPT | Performed by: INTERNAL MEDICINE

## 2023-01-05 PROCEDURE — 82962 GLUCOSE BLOOD TEST: CPT

## 2023-01-05 PROCEDURE — 63710000001 INSULIN LISPRO (HUMAN) PER 5 UNITS: Performed by: INTERNAL MEDICINE

## 2023-01-05 PROCEDURE — 85025 COMPLETE CBC W/AUTO DIFF WBC: CPT | Performed by: INTERNAL MEDICINE

## 2023-01-05 PROCEDURE — 63710000001 PREDNISONE PER 5 MG: Performed by: NURSE PRACTITIONER

## 2023-01-05 PROCEDURE — 97535 SELF CARE MNGMENT TRAINING: CPT

## 2023-01-05 PROCEDURE — 63710000001 LEVALBUTEROL PER 0.5 MG: Performed by: NURSE PRACTITIONER

## 2023-01-05 RX ORDER — POTASSIUM CHLORIDE 750 MG/1
20 CAPSULE, EXTENDED RELEASE ORAL
Status: DISCONTINUED | OUTPATIENT
Start: 2023-01-05 | End: 2023-01-05

## 2023-01-05 RX ORDER — NYSTATIN 100000 [USP'U]/G
POWDER TOPICAL 2 TIMES DAILY PRN
Status: DISCONTINUED | OUTPATIENT
Start: 2023-01-05 | End: 2023-01-16 | Stop reason: HOSPADM

## 2023-01-05 RX ORDER — FLUCONAZOLE 100 MG/1
100 TABLET ORAL
Status: DISCONTINUED | OUTPATIENT
Start: 2023-01-05 | End: 2023-01-05

## 2023-01-05 RX ORDER — ALPRAZOLAM 0.5 MG/1
0.5 TABLET ORAL 2 TIMES DAILY PRN
Status: DISCONTINUED | OUTPATIENT
Start: 2023-01-05 | End: 2023-01-11

## 2023-01-05 RX ORDER — FLUCONAZOLE 100 MG/1
150 TABLET ORAL
Status: DISCONTINUED | OUTPATIENT
Start: 2023-01-05 | End: 2023-01-07

## 2023-01-05 NOTE — PROGRESS NOTES
[facility]  FABBY Sung APRN        Internal Medicine Progress Note    1/5/2023   12:15 CST    Name:  Keisha Resendiz  MRN:    [id number]     Acct:     [id number]   Room:  [address] [place] [address] Day: 0     Admit Date: 12/29/2022  3:21 PM  PCP: Shannon Lilly, ANTONETTE    Subjective:     C/C: Oxygen weaning and rehabilitation efforts    Interval History: Status: Improved. Resting in bed.  No family at bedside. Afebrile. 02 sats stable with 02 at 5 lpm. WBC trending toward normal. Counts otherwise stable. Bowels moving and abdomen softer. Imaging of right lower extremity negative. C/o yeasty dermatitis to skin folds and ca-area.   Review of Systems   Constitutional: Negative for chills, decreased appetite, fever, malaise/fatigue, weight gain and weight loss.   HENT: Negative for congestion, ear discharge, ear pain, hoarse voice, sore throat and tinnitus.    Eyes: Negative for blurred vision, discharge, pain, visual disturbance and visual halos.   Cardiovascular: Positive for dyspnea on exertion and irregular heartbeat. Negative for chest pain, claudication, leg swelling, orthopnea and paroxysmal nocturnal dyspnea.   Respiratory: Positive for shortness of breath. Negative for cough, sputum production and wheezing.    Endocrine: Negative for cold intolerance, heat intolerance, polydipsia, polyphagia and polyuria.   Hematologic/Lymphatic: Negative for adenopathy. Does not bruise/bleed easily.   Skin: Positive for itching and rash. Negative for dry skin, flushing, nail changes and suspicious lesions.   Musculoskeletal: Positive for falls and muscle weakness. Negative for arthritis, back pain, joint pain and myalgias.   Gastrointestinal: Positive for constipation. Negative for bloating, abdominal pain, diarrhea, dysphagia and hematemesis.   Genitourinary: Negative for bladder incontinence, dysuria, flank pain and frequency.   Neurological: Positive for weakness. Negative for aphonia, difficulty with  concentration, disturbances in coordination, dizziness and headaches.   Psychiatric/Behavioral: Positive for altered mental status. Negative for depression, memory loss, substance abuse, suicidal ideas and thoughts of violence. The patient does not have insomnia and is not nervous/anxious.    Allergic/Immunologic: Negative for environmental allergies, HIV exposure, hives and persistent infections.         Medications:     Allergies:   Allergies   Allergen Reactions   • Clindamycin/Lincomycin Unknown - High Severity   • Keflex [Cephalexin] Unknown - High Severity   • Penicillins Unknown - High Severity   • Sulfa Antibiotics Unknown - High Severity       Current Meds:   Current Facility-Administered Medications:   •  acetaminophen (TYLENOL) tablet 650 mg, 650 mg, Oral, Q4H PRN **OR** acetaminophen (TYLENOL) suppository 650 mg, 650 mg, Rectal, Q4H PRN, Jose Alfredo Mcbride MD  •  ALPRAZolam (XANAX) tablet 0.5 mg, 0.5 mg, Oral, BID PRN, Jose Alfredo Mcbride MD  •  apixaban (ELIQUIS) tablet 5 mg, 5 mg, Oral, Q12H, Jose Alfredo Mcbride MD  •  atorvastatin (LIPITOR) tablet 20 mg, 20 mg, Oral, Nightly, Jose Alfredo Mcbride MD  •  dextrose (D50W) (25 g/50 mL) IV injection 25 g, 25 g, Intravenous, Q15 Min PRN, Jose Alfredo Mcbride MD  •  dextrose (GLUTOSE) oral gel 15 g, 15 g, Oral, Q15 Min PRN, Jose Alfredo Mcbride MD  •  dilTIAZem (CARDIZEM) 125 mg in 125 mL NS infusion, 5-15 mg/hr, Intravenous, Titrated, Jose Alfredo Mcbride MD  •  docusate sodium (COLACE) capsule 100 mg, 100 mg, Oral, BID PRN, Jose Alfredo Mcbride MD  •  flecainide (TAMBOCOR) tablet 100 mg, 100 mg, Oral, Q12H, Jose Alfredo Mcbride MD  •  glucagon (human recombinant) (GLUCAGEN DIAGNOSTIC) injection 1 mg, 1 mg, Subcutaneous, Q15 Min PRN, Jose Alfredo Mcbride MD  •  guaiFENesin (MUCINEX) 12 hr tablet 1,200 mg, 1,200 mg, Oral, Q12H, Jose Alfredo Mcbride MD  •  hydrALAZINE (APRESOLINE) tablet 25 mg, 25 mg, Oral, Q8H, Jose Alfredo Mcbride  MD Raoul  •  HYDROcod Polst-CPM Polst ER (TUSSIONEX PENNKINETIC) 10-8 MG/5ML ER suspension 5 mL, 5 mL, Oral, Q12H PRN, Jose Alfredo Mcbride MD  •  Insulin Lispro (humaLOG) injection 2-7 Units, 2-7 Units, Subcutaneous, 4x Daily AC & at Bedtime, Jose Alfredo Mcbride MD  •  ipratropium (ATROVENT) nebulizer solution 0.5 mg, 0.5 mg, Nebulization, TID - RT, Jose Alfredo Mcbride MD  •  levalbuterol (XOPENEX) nebulizer solution 0.63 mg, 0.63 mg, Nebulization, TID - RT, Osiris Jackson APRN  •  losartan (COZAAR) tablet 100 mg, 100 mg, Oral, Q24H, Jose Alfredo Mcbride MD  •  metoprolol succinate XL (TOPROL-XL) 24 hr tablet 25 mg, 25 mg, Oral, Q24H, Jose Alfredo Mcbride MD  •  metoprolol tartrate (LOPRESSOR) injection 5 mg, 5 mg, Intravenous, Q4H PRN, Jose Alfredo Mcbride MD  •  montelukast (SINGULAIR) tablet 10 mg, 10 mg, Oral, Nightly, Jose Alfredo Mcbride MD  •  nystatin (MYCOSTATIN) powder, , Topical, Q12H, Jose Alfredo Mcbride MD  •  nystatin (MYCOSTATIN) powder, , Topical, BID PRN, Raoul Arndt MD  •  ondansetron (ZOFRAN) tablet 4 mg, 4 mg, Oral, Q6H PRN **OR** ondansetron (ZOFRAN) injection 4 mg, 4 mg, Intravenous, Q6H PRN, Jose Alfredo Mcbride MD  •  pantoprazole (PROTONIX) EC tablet 40 mg, 40 mg, Oral, BID AC, Jose Alfredo Mcbride MD  •  polyethylene glycol (MIRALAX) packet 17 g, 17 g, Oral, Daily, Jose Alfredo Mcbride MD  •  potassium chloride (MICRO-K) CR capsule 20 mEq, 20 mEq, Oral, Daily, Jose Alfredo Mcbride MD  •  potassium chloride (MICRO-K) CR capsule 20 mEq, 20 mEq, Oral, Once, Jose Alfredo Mcbride MD  •  predniSONE (DELTASONE) tablet 10 mg, 10 mg, Oral, Daily, Osiris Jackson APRN  •  sennosides-docusate (PERICOLACE) 8.6-50 MG per tablet 2 tablet, 2 tablet, Oral, BID, Mena Salazar APRN  •  sodium chloride 0.9 % flush 10 mL, 10 mL, Intravenous, Q12H, Jos eAlfredo Mcbride MD  •  sodium chloride 0.9 % flush 10 mL, 10 mL, Intravenous, PRN, Jose Alfredo Mcbride,  "MD  •  torsemide (DEMADEX) tablet 20 mg, 20 mg, Oral, Daily, Jose Alfredo Mcbride MD  •  trolamine salicylate (ASPERCREME) 10 % cream 1 application, 1 application, Topical, PRN, Mena Salazar APRN  •  zolpidem (AMBIEN) tablet 5 mg, 5 mg, Oral, Nightly PRN, Jose Alfredo Mcbride MD    Data:     Code Status:    There are no questions and answers to display.       No family history on file.    Social History     Socioeconomic History   • Marital status:        Vitals:  Ht 162.6 cm (64\")   Wt 118 kg (259 lb 3.2 oz) Comment: Charge nurse says this wt is accurate  BMI 44.49 kg/m²   T 97.3 HR 62 RR 15 /49 SPO2 97% (5 lpm)          I/O (24Hr):  No intake or output data in the 24 hours ending 01/05/23 1215    Labs and imaging:      Recent Results (from the past 12 hour(s))   Basic Metabolic Panel    Collection Time: 01/05/23  5:19 AM    Specimen: Blood   Result Value Ref Range    Glucose 113 (H) 65 - 99 mg/dL    BUN 25 (H) 8 - 23 mg/dL    Creatinine 0.71 0.57 - 1.00 mg/dL    Sodium 134 (L) 136 - 145 mmol/L    Potassium 3.6 3.5 - 5.2 mmol/L    Chloride 94 (L) 98 - 107 mmol/L    CO2 33.0 (H) 22.0 - 29.0 mmol/L    Calcium 8.3 (L) 8.6 - 10.5 mg/dL    BUN/Creatinine Ratio 35.2 (H) 7.0 - 25.0    Anion Gap 7.0 5.0 - 15.0 mmol/L    eGFR 87.7 >60.0 mL/min/1.73   CBC Auto Differential    Collection Time: 01/05/23  5:19 AM    Specimen: Blood   Result Value Ref Range    WBC 12.15 (H) 3.40 - 10.80 10*3/mm3    RBC 4.55 3.77 - 5.28 10*6/mm3    Hemoglobin 13.0 12.0 - 15.9 g/dL    Hematocrit 41.3 34.0 - 46.6 %    MCV 90.8 79.0 - 97.0 fL    MCH 28.6 26.6 - 33.0 pg    MCHC 31.5 31.5 - 35.7 g/dL    RDW 15.9 (H) 12.3 - 15.4 %    RDW-SD 53.8 37.0 - 54.0 fl    MPV 11.5 6.0 - 12.0 fL    Platelets 228 140 - 450 10*3/mm3    Neutrophil % 72.2 42.7 - 76.0 %    Lymphocyte % 17.4 (L) 19.6 - 45.3 %    Monocyte % 6.7 5.0 - 12.0 %    Eosinophil % 1.8 0.3 - 6.2 %    Basophil % 0.3 0.0 - 1.5 %    Immature Grans % 1.6 (H) 0.0 - 0.5 % "    Neutrophils, Absolute 8.77 (H) 1.70 - 7.00 10*3/mm3    Lymphocytes, Absolute 2.11 0.70 - 3.10 10*3/mm3    Monocytes, Absolute 0.81 0.10 - 0.90 10*3/mm3    Eosinophils, Absolute 0.22 0.00 - 0.40 10*3/mm3    Basophils, Absolute 0.04 0.00 - 0.20 10*3/mm3    Immature Grans, Absolute 0.20 (H) 0.00 - 0.05 10*3/mm3    nRBC 0.0 0.0 - 0.2 /100 WBC   POC Glucose Once    Collection Time: 01/05/23  7:14 AM    Specimen: Blood   Result Value Ref Range    Glucose 107 70 - 130 mg/dL   POC Glucose Once    Collection Time: 01/05/23 11:19 AM    Specimen: Blood   Result Value Ref Range    Glucose 138 (H) 70 - 130 mg/dL         Physical Examination:        Physical Exam  Vitals and nursing note reviewed.   Constitutional:       Appearance: Normal appearance. She is obese.   HENT:      Head: Normocephalic and atraumatic.      Right Ear: External ear normal.      Left Ear: External ear normal.      Nose: Nose normal.      Mouth/Throat:      Mouth: Mucous membranes are moist.      Pharynx: Oropharynx is clear.   Eyes:      Extraocular Movements: Extraocular movements intact.      Conjunctiva/sclera: Conjunctivae normal.      Pupils: Pupils are equal, round, and reactive to light.   Cardiovascular:      Rate and Rhythm: Normal rate. Rhythm irregular.      Pulses: Normal pulses.      Heart sounds: Normal heart sounds.   Pulmonary:      Effort: Pulmonary effort is normal.      Breath sounds: Normal breath sounds.   Abdominal:      General: Bowel sounds are normal.      Palpations: Abdomen is soft.   Musculoskeletal:         General: Normal range of motion.      Cervical back: Normal range of motion and neck supple.      Comments: Generalized weakness   Skin:     General: Skin is warm and dry.      Capillary Refill: Capillary refill takes 2 to 3 seconds.      Findings: Erythema and rash present.   Neurological:      General: No focal deficit present.      Mental Status: She is alert and oriented to person, place, and time. Mental status is  at baseline.      Motor: Weakness present.   Psychiatric:         Mood and Affect: Mood normal.         Behavior: Behavior normal.         Thought Content: Thought content normal.         Judgment: Judgment normal.           Assessment:          * No active hospital problems. *    No past medical history on file.     Plan:        1. Acute hypoxic respiratory failure  2. Acute exacerbation COPD  3. Pulmonary Emboli  4. Chronic atrial fibrillation  5. Heart failure with preserved ejection fraction  6. Pulmonary fibrosis  7. Severe tracheomalacia  8. DVT s/p IVC filter placement  9. Leukocytosis - steroid induced?  10. Hypokalemia  11. Pulmonary hypertension     Continue current treatment. Monitor counts. Increase activity as tolerated. Labs Monday. Oxygen weaning as tolerated. Maintain patient safety. Continue diuresis. Continue bowel regimen. Add diflucan.       Electronically signed by KD Luna on 1/5/2023 at 12:15 CST

## 2023-01-06 LAB
ANION GAP SERPL CALCULATED.3IONS-SCNC: 10 MMOL/L (ref 5–15)
BUN SERPL-MCNC: 22 MG/DL (ref 8–23)
BUN/CREAT SERPL: 36.7 (ref 7–25)
CALCIUM SPEC-SCNC: 8.9 MG/DL (ref 8.6–10.5)
CHLORIDE SERPL-SCNC: 93 MMOL/L (ref 98–107)
CO2 SERPL-SCNC: 34 MMOL/L (ref 22–29)
CREAT SERPL-MCNC: 0.6 MG/DL (ref 0.57–1)
EGFRCR SERPLBLD CKD-EPI 2021: 92.6 ML/MIN/1.73
GLUCOSE BLDC GLUCOMTR-MCNC: 99 MG/DL (ref 70–130)
GLUCOSE SERPL-MCNC: 90 MG/DL (ref 65–99)
POTASSIUM SERPL-SCNC: 3.4 MMOL/L (ref 3.5–5.2)
SODIUM SERPL-SCNC: 137 MMOL/L (ref 136–145)

## 2023-01-06 PROCEDURE — 63710000001 ONDANSETRON PER 8 MG: Performed by: INTERNAL MEDICINE

## 2023-01-06 PROCEDURE — 80048 BASIC METABOLIC PNL TOTAL CA: CPT | Performed by: INTERNAL MEDICINE

## 2023-01-06 PROCEDURE — 97110 THERAPEUTIC EXERCISES: CPT

## 2023-01-06 PROCEDURE — 25010000002 ONDANSETRON PER 1 MG: Performed by: INTERNAL MEDICINE

## 2023-01-06 PROCEDURE — 63710000001 LEVALBUTEROL PER 0.5 MG: Performed by: NURSE PRACTITIONER

## 2023-01-06 PROCEDURE — 82962 GLUCOSE BLOOD TEST: CPT

## 2023-01-06 PROCEDURE — 97116 GAIT TRAINING THERAPY: CPT

## 2023-01-06 RX ORDER — POLYETHYLENE GLYCOL 3350 17 G/17G
17 POWDER, FOR SOLUTION ORAL DAILY PRN
Status: DISCONTINUED | OUTPATIENT
Start: 2023-01-06 | End: 2023-01-16 | Stop reason: HOSPADM

## 2023-01-06 RX ORDER — POTASSIUM CHLORIDE 750 MG/1
40 CAPSULE, EXTENDED RELEASE ORAL
Status: DISCONTINUED | OUTPATIENT
Start: 2023-01-06 | End: 2023-01-07

## 2023-01-06 NOTE — PROGRESS NOTES
Adult Nutrition  Assessment/PES    Patient Name:  Keisha Resendiz  YOB: 1945  MRN: [id number]  Admit Date:  12/29/2022    Assessment Date:  1/6/2023   Reason for Assessment     Row Name 01/06/23 1514          Reason for Assessment    Reason For Assessment follow-up protocol     Diagnosis cardiac disease;pulmonary disease                Nutrition/Diet History     Row Name 01/06/23 1514          Nutrition/Diet History    Typical Intake (Food/Fluid/EN/PN) Good appetite. KUB 1/2 due to nausea and epigastric pain with moderate stool; repeat KUB 1/5 no signficant changes.                Labs/Tests/Procedures/Meds     Row Name 01/06/23 1514          Labs/Procedures/Meds    Lab Results Reviewed reviewed        Diagnostic Tests/Procedures    Diagnostic Test/Procedure Reviewed reviewed        Medications    Pertinent Medications Reviewed reviewed     Pertinent Medications Comments See MAR                Physical Findings     Row Name 01/06/23 1514          Physical Findings    Overall Physical Appearance NC, BM 1/5, bilateral breasts and groin redness.                Estimated/Assessed Needs - Anthropometrics     Row Name 01/06/23 1515          Anthropometrics    Weight for Calculation 111 kg (244 lb 11.4 oz)        Estimated/Assessed Needs    Additional Documentation Fluid Requirements (Group);KCAL/KG (Group);Protein Requirements (Group)        KCAL/KG    KCAL/KG 14 Kcal/Kg (kcal)     14 Kcal/Kg (kcal) 1554        Protein Requirements    Weight Used For Protein Calculations 54.4 kg (120 lb)  IBW     Est Protein Requirement Amount (gms/kg) 1.4 gm protein     Estimated Protein Requirements (gms/day) 76.2        Fluid Requirements    Fluid Requirements (mL/day) 1633     Estimated Fluid Requirement Method other (see comments)  30mL/kg IBW     RDA Method (mL) 1633                Nutrition Prescription Ordered     Row Name 01/06/23 1515          Nutrition Prescription PO    Current PO Diet Regular     Fluid  Consistency Thin                Evaluation of Received Nutrient/Fluid Intake     Row Name 01/06/23 1515          Nutrient/Fluid Evaluation    Number of Days Evaluated 3 days        Fluid Intake Evaluation    Oral Fluid (mL) 1620        PO Evaluation    Number of Days PO Intake Evaluated 3 days     Number of Meals 2     % PO Intake 75,100                   Problem/Interventions:   Problem 1     Row Name 01/06/23 1515          Nutrition Diagnoses Problem 1    Problem 1 Nutrition Appropriate for Condition at this Time                      Intervention Goal     Row Name 01/06/23 1516          Intervention Goal    General Meet nutritional needs for age/condition;Disease management/therapy     PO Continue positive trend;Meet estimated needs     Weight Appropriate weight loss                Nutrition Intervention     Row Name 01/06/23 1516          Nutrition Intervention    RD/Tech Action Follow Tx progress;Care plan reviewd                Nutrition Prescription     Row Name 01/06/23 1516          Nutrition Prescription PO    PO Prescription Other (comment)  continue same protocol                Education/Evaluation     Row Name 01/06/23 1516          Education    Education No discharge needs identified at this time        Monitor/Evaluation    Monitor Per protocol                 Electronically signed by:  Juana Giles RD  01/06/23 15:17 CST

## 2023-01-06 NOTE — PROGRESS NOTES
[facility] Fall Risk Assessment Note    Name: Keisha Resendiz  MRN: [id number]  CSN: [id number]  Admit Date/Time: 12/29/2022  3:21 PM.    Currently ordered medications associated with an increased risk for fall include:    Scheduled Meds:  flecainide, 100 mg, Oral, Q12H  hydrALAZINE, 25 mg, Oral, Q8H  losartan, 100 mg, Oral, Q24H  metoprolol succinate XL, 25 mg, Oral, Q24H  montelukast, 10 mg, Oral, Nightly  senna-docusate sodium, 2 tablet, Oral, BID  torsemide, 20 mg, Oral, Daily    PRN Meds:  •  ALPRAZolam  •  metoprolol tartrate  •  ondansetron   •  polyethylene glycol  •  zolpidem    Malcom Desir PharmD  01/06/23 16:06 CST

## 2023-01-06 NOTE — PROGRESS NOTES
FABBY Zendejas APRN        Internal Medicine Progress Note    1/6/2023   11:14 CST    Name:  Keisha Resendiz  MRN:    [id number]     Acct:     [id number]   Room:  [address] [place] [address] Day: 0     Admit Date: 12/29/2022  3:21 PM  PCP: Shannon Lilly PA-C    Subjective:     C/C: Oxygen weaning and rehabilitation efforts    Interval History: Status: Improved. Up to chair.  No family at bedside. Afebrile. 02 sats stable with 02 at 5 lpm. Potassium 3.4. Counts otherwise stable. Bowels moving and abdomen softer.   Review of Systems   Constitutional: Negative for chills, decreased appetite, fever, malaise/fatigue, weight gain and weight loss.   HENT: Negative for congestion, ear discharge, ear pain, hoarse voice, sore throat and tinnitus.    Eyes: Negative for blurred vision, discharge, pain, visual disturbance and visual halos.   Cardiovascular: Positive for dyspnea on exertion and irregular heartbeat. Negative for chest pain, claudication, leg swelling, orthopnea and paroxysmal nocturnal dyspnea.   Respiratory: Positive for shortness of breath. Negative for cough, sputum production and wheezing.    Endocrine: Negative for cold intolerance, heat intolerance, polydipsia, polyphagia and polyuria.   Hematologic/Lymphatic: Negative for adenopathy. Does not bruise/bleed easily.   Skin: Positive for itching and rash. Negative for dry skin, flushing, nail changes and suspicious lesions.   Musculoskeletal: Positive for falls and muscle weakness. Negative for arthritis, back pain, joint pain and myalgias.   Gastrointestinal: Positive for constipation. Negative for bloating, abdominal pain, diarrhea, dysphagia and hematemesis.   Genitourinary: Negative for bladder incontinence, dysuria, flank pain and frequency.   Neurological: Positive for weakness. Negative for aphonia, difficulty with concentration, disturbances in coordination, dizziness and headaches.   Psychiatric/Behavioral: Positive for  altered mental status. Negative for depression, memory loss, substance abuse, suicidal ideas and thoughts of violence. The patient does not have insomnia and is not nervous/anxious.    Allergic/Immunologic: Negative for environmental allergies, HIV exposure, hives and persistent infections.         Medications:     Allergies:   Allergies   Allergen Reactions   • Clindamycin/Lincomycin Unknown - High Severity   • Keflex [Cephalexin] Unknown - High Severity   • Penicillins Unknown - High Severity   • Sulfa Antibiotics Unknown - High Severity       Current Meds:   Current Facility-Administered Medications:   •  acetaminophen (TYLENOL) tablet 650 mg, 650 mg, Oral, Q4H PRN **OR** acetaminophen (TYLENOL) suppository 650 mg, 650 mg, Rectal, Q4H PRN, Jose Alfredo Mcbride MD  •  ALPRAZolam (XANAX) tablet 0.5 mg, 0.5 mg, Oral, BID PRN, Jose Alfredo Mcbride MD  •  apixaban (ELIQUIS) tablet 5 mg, 5 mg, Oral, Q12H, Jose Alfredo Mcbride MD  •  atorvastatin (LIPITOR) tablet 20 mg, 20 mg, Oral, Nightly, Jose Alfredo Mcbride MD  •  dextrose (D50W) (25 g/50 mL) IV injection 25 g, 25 g, Intravenous, Q15 Min PRN, Jose Alfredo Mcbride MD  •  dextrose (GLUTOSE) oral gel 15 g, 15 g, Oral, Q15 Min PRN, Jose Alfredo Mcbride MD  •  dilTIAZem (CARDIZEM) 125 mg in 125 mL NS infusion, 5-15 mg/hr, Intravenous, Titrated, Jose Alfredo Mcbride MD  •  docusate sodium (COLACE) capsule 100 mg, 100 mg, Oral, BID PRN, Jose Alfredo Mcbride MD  •  flecainide (TAMBOCOR) tablet 100 mg, 100 mg, Oral, Q12H, Jose Alfredo Mcbride MD  •  fluconazole (DIFLUCAN) tablet 150 mg, 150 mg, Oral, Q24H, Mena Salazar APRN  •  glucagon (human recombinant) (GLUCAGEN DIAGNOSTIC) injection 1 mg, 1 mg, Subcutaneous, Q15 Min PRN, Jose Alfredo Mcbride MD  •  guaiFENesin (MUCINEX) 12 hr tablet 1,200 mg, 1,200 mg, Oral, Q12H, Jose Alfredo Mcbride MD  •  hydrALAZINE (APRESOLINE) tablet 25 mg, 25 mg, Oral, Q8H, Jose Alfredo Mcbride MD  •  Insulin  Lispro (humaLOG) injection 2-7 Units, 2-7 Units, Subcutaneous, 4x Daily AC & at Bedtime, Jose Alfredo Mcbride MD  •  ipratropium (ATROVENT) nebulizer solution 0.5 mg, 0.5 mg, Nebulization, TID - RT, Jose Alfredo Mcbride MD  •  levalbuterol (XOPENEX) nebulizer solution 0.63 mg, 0.63 mg, Nebulization, TID - RT, Osiris Jackson APRN  •  losartan (COZAAR) tablet 100 mg, 100 mg, Oral, Q24H, Jose Alfredo Mcbride MD  •  metoprolol succinate XL (TOPROL-XL) 24 hr tablet 25 mg, 25 mg, Oral, Q24H, Jose Alfredo Mcbride MD  •  metoprolol tartrate (LOPRESSOR) injection 5 mg, 5 mg, Intravenous, Q4H PRN, Jose Alfredo Mcbride MD  •  montelukast (SINGULAIR) tablet 10 mg, 10 mg, Oral, Nightly, Jose Alfredo Mcbride MD  •  nystatin (MYCOSTATIN) powder, , Topical, Q12H, Jose Alfredo Mcbride MD  •  nystatin (MYCOSTATIN) powder, , Topical, BID PRN, Raoul Arndt MD  •  ondansetron (ZOFRAN) tablet 4 mg, 4 mg, Oral, Q6H PRN **OR** ondansetron (ZOFRAN) injection 4 mg, 4 mg, Intravenous, Q6H PRN, Jose Alfredo Mcbride MD  •  pantoprazole (PROTONIX) EC tablet 40 mg, 40 mg, Oral, BID AC, Jose Alfredo Mcbride MD  •  polyethylene glycol (MIRALAX) packet 17 g, 17 g, Oral, Daily, Jose Alfredo Mcbride MD  •  potassium chloride (MICRO-K) CR capsule 20 mEq, 20 mEq, Oral, Daily, Jose Alfredo Mcbride MD  •  sennosides-docusate (PERICOLACE) 8.6-50 MG per tablet 2 tablet, 2 tablet, Oral, BID, Mena Salazar, APRN  •  sodium chloride 0.9 % flush 10 mL, 10 mL, Intravenous, Q12H, Jose Alfredo Mcbride MD  •  sodium chloride 0.9 % flush 10 mL, 10 mL, Intravenous, PRN, Jose Alfredo Mcbride MD  •  torsemide (DEMADEX) tablet 20 mg, 20 mg, Oral, Daily, Jose Alfredo Mcbride MD  •  trolamine salicylate (ASPERCREME) 10 % cream 1 application, 1 application, Topical, PRN, Mena Salazar APRN  •  zolpidem (AMBIEN) tablet 5 mg, 5 mg, Oral, Nightly PRN, Jose Alfredo Mcbride MD    Data:     Code Status:    There are no  "questions and answers to display.       No family history on file.    Social History     Socioeconomic History   • Marital status:        Vitals:  Ht 162.6 cm (64\")   Wt 118 kg (259 lb 3.2 oz) Comment: Charge nurse says this wt is accurate  BMI 44.49 kg/m²   T 97.3 HR 71 RR 15 /56 SPO2 94% (5 lpm)          I/O (24Hr):  No intake or output data in the 24 hours ending 01/06/23 1114    Labs and imaging:      Recent Results (from the past 12 hour(s))   Basic Metabolic Panel    Collection Time: 01/06/23  5:01 AM    Specimen: Blood   Result Value Ref Range    Glucose 90 65 - 99 mg/dL    BUN 22 8 - 23 mg/dL    Creatinine 0.60 0.57 - 1.00 mg/dL    Sodium 137 136 - 145 mmol/L    Potassium 3.4 (L) 3.5 - 5.2 mmol/L    Chloride 93 (L) 98 - 107 mmol/L    CO2 34.0 (H) 22.0 - 29.0 mmol/L    Calcium 8.9 8.6 - 10.5 mg/dL    BUN/Creatinine Ratio 36.7 (H) 7.0 - 25.0    Anion Gap 10.0 5.0 - 15.0 mmol/L    eGFR 92.6 >60.0 mL/min/1.73   POC Glucose Once    Collection Time: 01/06/23  7:32 AM    Specimen: Blood   Result Value Ref Range    Glucose 99 70 - 130 mg/dL         Physical Examination:        Physical Exam  Vitals and nursing note reviewed.   Constitutional:       Appearance: Normal appearance. She is obese.   HENT:      Head: Normocephalic and atraumatic.      Right Ear: External ear normal.      Left Ear: External ear normal.      Nose: Nose normal.      Mouth/Throat:      Mouth: Mucous membranes are moist.      Pharynx: Oropharynx is clear.   Eyes:      Extraocular Movements: Extraocular movements intact.      Conjunctiva/sclera: Conjunctivae normal.      Pupils: Pupils are equal, round, and reactive to light.   Cardiovascular:      Rate and Rhythm: Normal rate. Rhythm irregular.      Pulses: Normal pulses.      Heart sounds: Normal heart sounds.   Pulmonary:      Effort: Pulmonary effort is normal.      Breath sounds: Normal breath sounds.   Abdominal:      General: Bowel sounds are normal.      Palpations: " Abdomen is soft.   Musculoskeletal:         General: Normal range of motion.      Cervical back: Normal range of motion and neck supple.      Comments: Generalized weakness   Skin:     General: Skin is warm and dry.      Capillary Refill: Capillary refill takes 2 to 3 seconds.      Findings: Erythema and rash present.   Neurological:      General: No focal deficit present.      Mental Status: She is alert and oriented to person, place, and time. Mental status is at baseline.      Motor: Weakness present.   Psychiatric:         Mood and Affect: Mood normal.         Behavior: Behavior normal.         Thought Content: Thought content normal.         Judgment: Judgment normal.           Assessment:          * No active hospital problems. *    No past medical history on file.     Plan:        1. Acute hypoxic respiratory failure  2. Acute exacerbation COPD  3. Pulmonary Emboli  4. Chronic atrial fibrillation  5. Heart failure with preserved ejection fraction  6. Pulmonary fibrosis  7. Severe tracheomalacia  8. DVT s/p IVC filter placement  9. Leukocytosis - steroid induced?  10. Hypokalemia  11. Pulmonary hypertension     Continue current treatment. Monitor counts. Increase activity as tolerated. Labs Monday. Oxygen weaning as tolerated. Maintain patient safety. Continue diuresis. Continue bowel regimen. Add diflucan. Replace and recheck potassium.       Electronically signed by KD Luna on 1/6/2023 at 11:14 CST

## 2023-01-07 LAB
ANION GAP SERPL CALCULATED.3IONS-SCNC: 11 MMOL/L (ref 5–15)
BUN SERPL-MCNC: 24 MG/DL (ref 8–23)
BUN/CREAT SERPL: 32 (ref 7–25)
CALCIUM SPEC-SCNC: 8.3 MG/DL (ref 8.6–10.5)
CHLORIDE SERPL-SCNC: 96 MMOL/L (ref 98–107)
CO2 SERPL-SCNC: 30 MMOL/L (ref 22–29)
CREAT SERPL-MCNC: 0.75 MG/DL (ref 0.57–1)
EGFRCR SERPLBLD CKD-EPI 2021: 82.1 ML/MIN/1.73
GLUCOSE SERPL-MCNC: 98 MG/DL (ref 65–99)
MAGNESIUM SERPL-MCNC: 1.5 MG/DL (ref 1.6–2.4)
POTASSIUM SERPL-SCNC: 3.9 MMOL/L (ref 3.5–5.2)
SODIUM SERPL-SCNC: 137 MMOL/L (ref 136–145)

## 2023-01-07 PROCEDURE — 97530 THERAPEUTIC ACTIVITIES: CPT

## 2023-01-07 PROCEDURE — 63710000001 LEVALBUTEROL PER 0.5 MG: Performed by: NURSE PRACTITIONER

## 2023-01-07 PROCEDURE — 97116 GAIT TRAINING THERAPY: CPT

## 2023-01-07 PROCEDURE — 63710000001 ONDANSETRON PER 8 MG: Performed by: INTERNAL MEDICINE

## 2023-01-07 PROCEDURE — 80048 BASIC METABOLIC PNL TOTAL CA: CPT | Performed by: INTERNAL MEDICINE

## 2023-01-07 PROCEDURE — 83735 ASSAY OF MAGNESIUM: CPT | Performed by: INTERNAL MEDICINE

## 2023-01-07 NOTE — PROGRESS NOTES
FABBY Zendejas APRN        Internal Medicine Progress Note    1/7/2023   06:50 CST    Name:  Keisha Resendiz  MRN:    [id number]     Acct:     [id number]   Room:  [address] [place] [address] Day: 0     Admit Date: 12/29/2022  3:21 PM  PCP: Shannon Lilly, ANTONETTE    Subjective:     C/C: Oxygen weaning and rehabilitation efforts    Interval History: Status: Improved. Resting in bed. No family at bedside. Awakens easily, states her leg feels better today and she can lift it more off of bed. No complaints at this time. Potassium corrected. BS stable.   Review of Systems   Constitutional: Negative for chills, decreased appetite, fever, malaise/fatigue, weight gain and weight loss.   HENT: Negative for congestion, ear discharge, ear pain, hoarse voice, sore throat and tinnitus.    Eyes: Negative for blurred vision, discharge, pain, visual disturbance and visual halos.   Cardiovascular: Positive for dyspnea on exertion and irregular heartbeat. Negative for chest pain, claudication, leg swelling, orthopnea and paroxysmal nocturnal dyspnea.   Respiratory: Positive for shortness of breath. Negative for cough, sputum production and wheezing.    Endocrine: Negative for cold intolerance, heat intolerance, polydipsia, polyphagia and polyuria.   Hematologic/Lymphatic: Negative for adenopathy. Does not bruise/bleed easily.   Skin: Positive for itching and rash. Negative for dry skin, flushing, nail changes and suspicious lesions.   Musculoskeletal: Positive for falls and muscle weakness. Negative for arthritis, back pain, joint pain and myalgias.   Gastrointestinal: Positive for constipation. Negative for bloating, abdominal pain, diarrhea, dysphagia and hematemesis.   Genitourinary: Negative for bladder incontinence, dysuria, flank pain and frequency.   Neurological: Positive for weakness. Negative for aphonia, difficulty with concentration, disturbances in coordination, dizziness and headaches.    Psychiatric/Behavioral: Positive for altered mental status. Negative for depression, memory loss, substance abuse, suicidal ideas and thoughts of violence. The patient does not have insomnia and is not nervous/anxious.    Allergic/Immunologic: Negative for environmental allergies, HIV exposure, hives and persistent infections.         Medications:     Allergies:   Allergies   Allergen Reactions   • Clindamycin/Lincomycin Unknown - High Severity   • Keflex [Cephalexin] Unknown - High Severity   • Penicillins Unknown - High Severity   • Sulfa Antibiotics Unknown - High Severity       Current Meds:   Current Facility-Administered Medications:   •  acetaminophen (TYLENOL) tablet 650 mg, 650 mg, Oral, Q4H PRN **OR** acetaminophen (TYLENOL) suppository 650 mg, 650 mg, Rectal, Q4H PRN, Jose Alfredo Mcbride MD  •  ALPRAZolam (XANAX) tablet 0.5 mg, 0.5 mg, Oral, BID PRN, Jose Alfredo Mcbride MD  •  apixaban (ELIQUIS) tablet 5 mg, 5 mg, Oral, Q12H, Jose Alfredo Mcbride MD  •  atorvastatin (LIPITOR) tablet 20 mg, 20 mg, Oral, Nightly, Jose Alfredo Mcbride MD  •  dextrose (D50W) (25 g/50 mL) IV injection 25 g, 25 g, Intravenous, Q15 Min PRN, Jose Alfredo Mcbride MD  •  dextrose (GLUTOSE) oral gel 15 g, 15 g, Oral, Q15 Min PRN, Jose Alfredo Mcbride MD  •  dilTIAZem (CARDIZEM) 125 mg in 125 mL NS infusion, 5-15 mg/hr, Intravenous, Titrated, Jose Alfredo Mcbride MD  •  docusate sodium (COLACE) capsule 100 mg, 100 mg, Oral, BID PRN, Jose Alfredo Mcbride MD  •  flecainide (TAMBOCOR) tablet 100 mg, 100 mg, Oral, Q12H, Jose Alfredo Mcbride MD  •  fluconazole (DIFLUCAN) tablet 150 mg, 150 mg, Oral, Q24H, Mena Salazar APRN  •  glucagon (human recombinant) (GLUCAGEN DIAGNOSTIC) injection 1 mg, 1 mg, Subcutaneous, Q15 Min PRN, Jose Alfredo Mcbride MD  •  guaiFENesin (MUCINEX) 12 hr tablet 1,200 mg, 1,200 mg, Oral, Q12H, Jose Alfredo Mcbride MD  •  hydrALAZINE (APRESOLINE) tablet 25 mg, 25 mg, Oral, Q8H,  Jose Alfredo Mcbride MD  •  ipratropium (ATROVENT) nebulizer solution 0.5 mg, 0.5 mg, Nebulization, TID - RT, Jose Alfredo Mcbride MD  •  levalbuterol (XOPENEX) nebulizer solution 0.63 mg, 0.63 mg, Nebulization, TID - RT, Osiris Jackson APRN  •  losartan (COZAAR) tablet 100 mg, 100 mg, Oral, Q24H, Jose Alfredo Mcbride MD  •  metoprolol succinate XL (TOPROL-XL) 24 hr tablet 25 mg, 25 mg, Oral, Q24H, Jose Alfredo Mcbride MD  •  metoprolol tartrate (LOPRESSOR) injection 5 mg, 5 mg, Intravenous, Q4H PRN, Jose Alfredo Mcbride MD  •  montelukast (SINGULAIR) tablet 10 mg, 10 mg, Oral, Nightly, Jose Alfredo Mcbride MD  •  nystatin (MYCOSTATIN) powder, , Topical, Q12H, Jose Alfredo Mcbride MD  •  nystatin (MYCOSTATIN) powder, , Topical, BID PRN, Raoul Arndt MD  •  ondansetron (ZOFRAN) tablet 4 mg, 4 mg, Oral, Q6H PRN **OR** ondansetron (ZOFRAN) injection 4 mg, 4 mg, Intravenous, Q6H PRN, Jose Alfredo Mcbride MD  •  pantoprazole (PROTONIX) EC tablet 40 mg, 40 mg, Oral, BID AC, Jose Alfredo Mcbride MD  •  polyethylene glycol (MIRALAX) packet 17 g, 17 g, Oral, Daily PRN, Mena Salazar, KD  •  potassium chloride (MICRO-K) CR capsule 20 mEq, 20 mEq, Oral, Daily, Jose Alfredo Mcbride MD  •  sennosides-docusate (PERICOLACE) 8.6-50 MG per tablet 2 tablet, 2 tablet, Oral, BID, Mena Salazar, KD  •  sodium chloride 0.9 % flush 10 mL, 10 mL, Intravenous, Q12H, Jose Alfredo Mcbride MD  •  sodium chloride 0.9 % flush 10 mL, 10 mL, Intravenous, PRN, Jose Alfredo Mcbride MD  •  torsemide (DEMADEX) tablet 20 mg, 20 mg, Oral, Daily, Jose Alfredo Mcbride MD  •  trolamine salicylate (ASPERCREME) 10 % cream 1 application, 1 application, Topical, PRN, Mena Salazar APRN  •  zolpidem (AMBIEN) tablet 5 mg, 5 mg, Oral, Nightly PRN, Jose Alfredo Mcbride MD    Data:     Code Status:    There are no questions and answers to display.       No family history on file.    Social History  "    Socioeconomic History   • Marital status:        Vitals:  Ht 162.6 cm (64\")   Wt 118 kg (259 lb 3.2 oz) Comment: Charge nurse says this wt is accurate  BMI 44.49 kg/m²   T 97.6 HR 67 RR 20 /4 SPO2 94% (5 lpm)          I/O (24Hr):  No intake or output data in the 24 hours ending 01/07/23 0650    Labs and imaging:      Recent Results (from the past 12 hour(s))   Basic Metabolic Panel    Collection Time: 01/07/23  3:49 AM    Specimen: Blood   Result Value Ref Range    Glucose 98 65 - 99 mg/dL    BUN 24 (H) 8 - 23 mg/dL    Creatinine 0.75 0.57 - 1.00 mg/dL    Sodium 137 136 - 145 mmol/L    Potassium 3.9 3.5 - 5.2 mmol/L    Chloride 96 (L) 98 - 107 mmol/L    CO2 30.0 (H) 22.0 - 29.0 mmol/L    Calcium 8.3 (L) 8.6 - 10.5 mg/dL    BUN/Creatinine Ratio 32.0 (H) 7.0 - 25.0    Anion Gap 11.0 5.0 - 15.0 mmol/L    eGFR 82.1 >60.0 mL/min/1.73   Magnesium    Collection Time: 01/07/23  3:49 AM    Specimen: Blood   Result Value Ref Range    Magnesium 1.5 (L) 1.6 - 2.4 mg/dL         Physical Examination:        Physical Exam  Vitals and nursing note reviewed.   Constitutional:       Appearance: Normal appearance. She is obese.   HENT:      Head: Normocephalic and atraumatic.      Right Ear: External ear normal.      Left Ear: External ear normal.      Nose: Nose normal.      Mouth/Throat:      Mouth: Mucous membranes are moist.      Pharynx: Oropharynx is clear.   Eyes:      Extraocular Movements: Extraocular movements intact.      Conjunctiva/sclera: Conjunctivae normal.      Pupils: Pupils are equal, round, and reactive to light.   Cardiovascular:      Rate and Rhythm: Normal rate. Rhythm irregular.      Pulses: Normal pulses.      Heart sounds: Normal heart sounds.   Pulmonary:      Effort: Pulmonary effort is normal.      Breath sounds: Normal breath sounds.   Abdominal:      General: Bowel sounds are normal.      Palpations: Abdomen is soft.   Musculoskeletal:         General: Normal range of motion.      " Cervical back: Normal range of motion and neck supple.      Comments: Generalized weakness   Skin:     General: Skin is warm and dry.      Capillary Refill: Capillary refill takes 2 to 3 seconds.      Findings: Erythema and rash present.   Neurological:      General: No focal deficit present.      Mental Status: She is alert and oriented to person, place, and time. Mental status is at baseline.      Motor: Weakness present.   Psychiatric:         Mood and Affect: Mood normal.         Behavior: Behavior normal.         Thought Content: Thought content normal.         Judgment: Judgment normal.           Assessment:          * No active hospital problems. *    No past medical history on file.     Plan:        1. Acute hypoxic respiratory failure  2. Acute exacerbation COPD  3. Pulmonary Emboli  4. Chronic atrial fibrillation  5. Heart failure with preserved ejection fraction  6. Pulmonary fibrosis  7. Severe tracheomalacia  8. DVT s/p IVC filter placement  9. Leukocytosis - steroid induced?  10. Hypokalemia  11. Pulmonary hypertension     Continue current treatment. Monitor counts. Increase activity as tolerated. Labs Monday. Oxygen weaning as tolerated. Maintain patient safety. Continue diuresis. Continue bowel regimen.       Electronically signed by KD Grey on 1/7/2023 at 06:50 CST     I have discussed the care of Keisha Resendiz, including pertinent history and exam findings, with the nurse practitioner.    I have seen and examined the patient and the key elements of all parts of the encounter have been performed by me.  I agree with the assessment, plan and orders as documented by KD Grajeda, after I modified the exam findings and the plan of treatments and the final version is my approved version of the assessment.        Electronically signed by Jose Alfredo Mcbride MD on 1/8/2023 at 07:28 CST

## 2023-01-08 PROCEDURE — 63710000001 LEVALBUTEROL PER 0.5 MG: Performed by: NURSE PRACTITIONER

## 2023-01-08 PROCEDURE — 25010000002 MAGNESIUM SULFATE 2 GM/50ML SOLUTION: Performed by: INTERNAL MEDICINE

## 2023-01-08 PROCEDURE — 25010000002 ONDANSETRON PER 1 MG: Performed by: INTERNAL MEDICINE

## 2023-01-08 PROCEDURE — 63710000001 ONDANSETRON PER 8 MG: Performed by: INTERNAL MEDICINE

## 2023-01-08 RX ORDER — MAGNESIUM SULFATE HEPTAHYDRATE 40 MG/ML
2 INJECTION, SOLUTION INTRAVENOUS
Status: DISPENSED | OUTPATIENT
Start: 2023-01-08 | End: 2023-01-08

## 2023-01-08 NOTE — PROGRESS NOTES
FABBY Zendejas APRN        Internal Medicine Progress Note    1/8/2023   07:48 CST    Name:  Keisha Resendiz  MRN:    [id number]     Acct:     [id number]   Room:  [address] [place] [address] Day: 0     Admit Date: 12/29/2022  3:21 PM  PCP: Shannon Lilly PA-C    Subjective:     C/C: Oxygen weaning and rehabilitation efforts    Interval History: Status: Improved. Resting in bed. No family at bedside. Complains of gas pains during night but better this am. Staff report holding hydralazine due to BP being low and patient also having some loose stool. Potassium corrected.     Review of Systems   Constitutional: Negative for chills, decreased appetite, fever, malaise/fatigue, weight gain and weight loss.   HENT: Negative for congestion, ear discharge, ear pain, hoarse voice, sore throat and tinnitus.    Eyes: Negative for blurred vision, discharge, pain, visual disturbance and visual halos.   Cardiovascular: Positive for dyspnea on exertion and irregular heartbeat. Negative for chest pain, claudication, leg swelling, orthopnea and paroxysmal nocturnal dyspnea.   Respiratory: Positive for shortness of breath. Negative for cough, sputum production and wheezing.    Endocrine: Negative for cold intolerance, heat intolerance, polydipsia, polyphagia and polyuria.   Hematologic/Lymphatic: Negative for adenopathy. Does not bruise/bleed easily.   Skin: Positive for itching and rash. Negative for dry skin, flushing, nail changes and suspicious lesions.   Musculoskeletal: Positive for falls and muscle weakness. Negative for arthritis, back pain, joint pain and myalgias.   Gastrointestinal: Positive for constipation. Negative for bloating, abdominal pain, diarrhea, dysphagia and hematemesis.   Genitourinary: Negative for bladder incontinence, dysuria, flank pain and frequency.   Neurological: Positive for weakness. Negative for aphonia, difficulty with concentration, disturbances in coordination,  dizziness and headaches.   Psychiatric/Behavioral: Positive for altered mental status. Negative for depression, memory loss, substance abuse, suicidal ideas and thoughts of violence. The patient does not have insomnia and is not nervous/anxious.    Allergic/Immunologic: Negative for environmental allergies, HIV exposure, hives and persistent infections.         Medications:     Allergies:   Allergies   Allergen Reactions   • Clindamycin/Lincomycin Unknown - High Severity   • Keflex [Cephalexin] Unknown - High Severity   • Penicillins Unknown - High Severity   • Sulfa Antibiotics Unknown - High Severity       Current Meds:   Current Facility-Administered Medications:   •  acetaminophen (TYLENOL) tablet 650 mg, 650 mg, Oral, Q4H PRN **OR** acetaminophen (TYLENOL) suppository 650 mg, 650 mg, Rectal, Q4H PRN, Jose Alfredo Mcbride MD  •  ALPRAZolam (XANAX) tablet 0.5 mg, 0.5 mg, Oral, BID PRN, Jose Alfredo Mcbride MD  •  apixaban (ELIQUIS) tablet 5 mg, 5 mg, Oral, Q12H, Jose Alfredo Mcbride MD  •  atorvastatin (LIPITOR) tablet 20 mg, 20 mg, Oral, Nightly, Jose Alfredo Mcbride MD  •  dextrose (D50W) (25 g/50 mL) IV injection 25 g, 25 g, Intravenous, Q15 Min PRN, Jose Alfredo Mcbride MD  •  dextrose (GLUTOSE) oral gel 15 g, 15 g, Oral, Q15 Min PRN, Jose Alfredo Mcbride MD  •  dilTIAZem (CARDIZEM) 125 mg in 125 mL NS infusion, 5-15 mg/hr, Intravenous, Titrated, Jose Alfredo Mcbride MD  •  docusate sodium (COLACE) capsule 100 mg, 100 mg, Oral, BID PRN, Jose Alfredo Mcbride MD  •  flecainide (TAMBOCOR) tablet 100 mg, 100 mg, Oral, Q12H, Jose Alfredo Mcbride MD  •  glucagon (human recombinant) (GLUCAGEN DIAGNOSTIC) injection 1 mg, 1 mg, Subcutaneous, Q15 Min PRN, Jose Alfredo Mcbride MD  •  guaiFENesin (MUCINEX) 12 hr tablet 1,200 mg, 1,200 mg, Oral, Q12H, Jose Alfredo Mcbride MD  •  hydrALAZINE (APRESOLINE) tablet 25 mg, 25 mg, Oral, Q8H, Jose Alfredo Mcbride MD  •  ipratropium (ATROVENT) nebulizer  solution 0.5 mg, 0.5 mg, Nebulization, TID - RT, Jose Alfredo Mcbride MD  •  levalbuterol (XOPENEX) nebulizer solution 0.63 mg, 0.63 mg, Nebulization, TID - RT, Osiris Jackson APRN  •  losartan (COZAAR) tablet 100 mg, 100 mg, Oral, Q24H, Jose Alfredo Mcbride MD  •  metoprolol succinate XL (TOPROL-XL) 24 hr tablet 25 mg, 25 mg, Oral, Q24H, Jose Alfredo Mcbride MD  •  metoprolol tartrate (LOPRESSOR) injection 5 mg, 5 mg, Intravenous, Q4H PRN, Jose Alfredo Mcbride MD  •  montelukast (SINGULAIR) tablet 10 mg, 10 mg, Oral, Nightly, Jose Alfredo Mcbride MD  •  nystatin (MYCOSTATIN) powder, , Topical, Q12H, Jose Alfredo Mcbride MD  •  nystatin (MYCOSTATIN) powder, , Topical, BID PRN, Raoul Arndt MD  •  ondansetron (ZOFRAN) tablet 4 mg, 4 mg, Oral, Q6H PRN **OR** ondansetron (ZOFRAN) injection 4 mg, 4 mg, Intravenous, Q6H PRN, Jose Alfredo Mcbride MD  •  pantoprazole (PROTONIX) EC tablet 40 mg, 40 mg, Oral, BID AC, Jose Alfredo Mcbride MD  •  polyethylene glycol (MIRALAX) packet 17 g, 17 g, Oral, Daily PRN, Mena Salazar, APRN  •  potassium chloride (MICRO-K) CR capsule 20 mEq, 20 mEq, Oral, Daily, Jose Alfredo Mcbride MD  •  sennosides-docusate (PERICOLACE) 8.6-50 MG per tablet 2 tablet, 2 tablet, Oral, BID, Mena Salazar, KD  •  sodium chloride 0.9 % flush 10 mL, 10 mL, Intravenous, Q12H, Jose Alfredo Mcbride MD  •  sodium chloride 0.9 % flush 10 mL, 10 mL, Intravenous, PRN, Jose Alfredo Mcbride MD  •  torsemide (DEMADEX) tablet 20 mg, 20 mg, Oral, Daily, Jose Alfredo Mcbride MD  •  trolamine salicylate (ASPERCREME) 10 % cream 1 application, 1 application, Topical, PRN, Mena Salazar APRN  •  zolpidem (AMBIEN) tablet 5 mg, 5 mg, Oral, Nightly PRN, Jose Alfredo Mcbride MD    Data:     Code Status:    There are no questions and answers to display.       No family history on file.    Social History     Socioeconomic History   • Marital status:   "      Vitals:  Ht 162.6 cm (64\")   Wt 118 kg (259 lb 3.2 oz) Comment: Charge nurse says this wt is accurate  BMI 44.49 kg/m²   T 98.6 HR 66 RR 20 /58 SPO2 96% (5 lpm)          I/O (24Hr):  No intake or output data in the 24 hours ending 01/08/23 0748    Labs and imaging:      No results found for this or any previous visit (from the past 12 hour(s)).      Physical Examination:        Physical Exam  Vitals and nursing note reviewed.   Constitutional:       Appearance: Normal appearance. She is obese.   HENT:      Head: Normocephalic and atraumatic.      Right Ear: External ear normal.      Left Ear: External ear normal.      Nose: Nose normal.      Mouth/Throat:      Mouth: Mucous membranes are moist.      Pharynx: Oropharynx is clear.   Eyes:      Extraocular Movements: Extraocular movements intact.      Conjunctiva/sclera: Conjunctivae normal.      Pupils: Pupils are equal, round, and reactive to light.   Cardiovascular:      Rate and Rhythm: Normal rate. Rhythm irregular.      Pulses: Normal pulses.      Heart sounds: Normal heart sounds.   Pulmonary:      Effort: Pulmonary effort is normal.      Breath sounds: Normal breath sounds.   Abdominal:      General: Bowel sounds are normal.      Palpations: Abdomen is soft.   Musculoskeletal:         General: Normal range of motion.      Cervical back: Normal range of motion and neck supple.      Comments: Generalized weakness   Skin:     General: Skin is warm and dry.      Capillary Refill: Capillary refill takes 2 to 3 seconds.      Findings: Erythema and rash present.   Neurological:      General: No focal deficit present.      Mental Status: She is alert and oriented to person, place, and time. Mental status is at baseline.      Motor: Weakness present.   Psychiatric:         Mood and Affect: Mood normal.         Behavior: Behavior normal.         Thought Content: Thought content normal.         Judgment: Judgment normal.           Assessment:          * No " active hospital problems. *    No past medical history on file.     Plan:        1. Acute hypoxic respiratory failure  2. Acute exacerbation COPD  3. Pulmonary Emboli  4. Chronic atrial fibrillation  5. Heart failure with preserved ejection fraction  6. Pulmonary fibrosis  7. Severe tracheomalacia  8. DVT s/p IVC filter placement  9. Leukocytosis - steroid induced?  10. Hypokalemia  11. Pulmonary hypertension     Continue current treatment. Monitor counts. Increase activity as tolerated. Labs Monday. Oxygen weaning as tolerated. Maintain patient safety. Continue diuresis. Hold Hydralazine and Senokot.       Electronically signed by KD Grey on 1/8/2023 at 07:48 CST

## 2023-01-09 LAB
ANION GAP SERPL CALCULATED.3IONS-SCNC: 12 MMOL/L (ref 5–15)
BASOPHILS # BLD AUTO: 0.05 10*3/MM3 (ref 0–0.2)
BASOPHILS NFR BLD AUTO: 0.6 % (ref 0–1.5)
BUN SERPL-MCNC: 24 MG/DL (ref 8–23)
BUN/CREAT SERPL: 31.6 (ref 7–25)
CALCIUM SPEC-SCNC: 8.6 MG/DL (ref 8.6–10.5)
CHLORIDE SERPL-SCNC: 94 MMOL/L (ref 98–107)
CO2 SERPL-SCNC: 30 MMOL/L (ref 22–29)
CREAT SERPL-MCNC: 0.76 MG/DL (ref 0.57–1)
DEPRECATED RDW RBC AUTO: 52.6 FL (ref 37–54)
EGFRCR SERPLBLD CKD-EPI 2021: 80.8 ML/MIN/1.73
EOSINOPHIL # BLD AUTO: 0.21 10*3/MM3 (ref 0–0.4)
EOSINOPHIL NFR BLD AUTO: 2.3 % (ref 0.3–6.2)
ERYTHROCYTE [DISTWIDTH] IN BLOOD BY AUTOMATED COUNT: 15.9 % (ref 12.3–15.4)
GLUCOSE BLDC GLUCOMTR-MCNC: 130 MG/DL (ref 70–130)
GLUCOSE BLDC GLUCOMTR-MCNC: 151 MG/DL (ref 70–130)
GLUCOSE BLDC GLUCOMTR-MCNC: 161 MG/DL (ref 70–130)
GLUCOSE SERPL-MCNC: 125 MG/DL (ref 65–99)
HCT VFR BLD AUTO: 40 % (ref 34–46.6)
HGB BLD-MCNC: 12.4 G/DL (ref 12–15.9)
IMM GRANULOCYTES # BLD AUTO: 0.16 10*3/MM3 (ref 0–0.05)
IMM GRANULOCYTES NFR BLD AUTO: 1.8 % (ref 0–0.5)
LYMPHOCYTES # BLD AUTO: 1.69 10*3/MM3 (ref 0.7–3.1)
LYMPHOCYTES NFR BLD AUTO: 18.8 % (ref 19.6–45.3)
MAGNESIUM SERPL-MCNC: 2.9 MG/DL (ref 1.6–2.4)
MCH RBC QN AUTO: 28.1 PG (ref 26.6–33)
MCHC RBC AUTO-ENTMCNC: 31 G/DL (ref 31.5–35.7)
MCV RBC AUTO: 90.5 FL (ref 79–97)
MONOCYTES # BLD AUTO: 0.64 10*3/MM3 (ref 0.1–0.9)
MONOCYTES NFR BLD AUTO: 7.1 % (ref 5–12)
NEUTROPHILS NFR BLD AUTO: 6.23 10*3/MM3 (ref 1.7–7)
NEUTROPHILS NFR BLD AUTO: 69.4 % (ref 42.7–76)
NRBC BLD AUTO-RTO: 0 /100 WBC (ref 0–0.2)
NT-PROBNP SERPL-MCNC: 233.8 PG/ML (ref 0–1800)
PLATELET # BLD AUTO: 183 10*3/MM3 (ref 140–450)
PMV BLD AUTO: 10.4 FL (ref 6–12)
POTASSIUM SERPL-SCNC: 3.5 MMOL/L (ref 3.5–5.2)
RBC # BLD AUTO: 4.42 10*6/MM3 (ref 3.77–5.28)
SODIUM SERPL-SCNC: 136 MMOL/L (ref 136–145)
WBC NRBC COR # BLD: 8.98 10*3/MM3 (ref 3.4–10.8)

## 2023-01-09 PROCEDURE — 83735 ASSAY OF MAGNESIUM: CPT | Performed by: INTERNAL MEDICINE

## 2023-01-09 PROCEDURE — 83880 ASSAY OF NATRIURETIC PEPTIDE: CPT | Performed by: INTERNAL MEDICINE

## 2023-01-09 PROCEDURE — 97110 THERAPEUTIC EXERCISES: CPT

## 2023-01-09 PROCEDURE — 97530 THERAPEUTIC ACTIVITIES: CPT

## 2023-01-09 PROCEDURE — 63710000001 LEVALBUTEROL PER 0.5 MG: Performed by: NURSE PRACTITIONER

## 2023-01-09 PROCEDURE — 82962 GLUCOSE BLOOD TEST: CPT

## 2023-01-09 PROCEDURE — 85025 COMPLETE CBC W/AUTO DIFF WBC: CPT | Performed by: INTERNAL MEDICINE

## 2023-01-09 PROCEDURE — 97535 SELF CARE MNGMENT TRAINING: CPT

## 2023-01-09 PROCEDURE — 97116 GAIT TRAINING THERAPY: CPT

## 2023-01-09 PROCEDURE — 25010000002 ONDANSETRON PER 1 MG: Performed by: INTERNAL MEDICINE

## 2023-01-09 PROCEDURE — 80048 BASIC METABOLIC PNL TOTAL CA: CPT | Performed by: INTERNAL MEDICINE

## 2023-01-09 RX ORDER — HYDRALAZINE HYDROCHLORIDE 10 MG/1
10 TABLET, FILM COATED ORAL EVERY 8 HOURS SCHEDULED
Status: DISCONTINUED | OUTPATIENT
Start: 2023-01-09 | End: 2023-01-12

## 2023-01-09 NOTE — PROGRESS NOTES
[facility]  LUCAS Mcbride M.D.  KD Guillen        Internal Medicine Progress Note    1/9/2023   10:06 CST    Name:  Keisha Resendiz  MRN:    [id number]     Acct:     [id number]   Room:  [address] [place] [address] Day: 0     Admit Date: 12/29/2022  3:21 PM  PCP: Shannon Lilly, ANTONETTE    Subjective:     C/C: Oxygen weaning and rehabilitation efforts    Interval History: Status: Improved. Resting in bed. No family at bedside. Afebrile. Returned from shower and having increased fatigue. Hoping for a nap prior to therapy this morning. Counts stable. Denies pain. 02 sats stsble on room air.     Review of Systems   Constitutional: Negative for chills, decreased appetite, fever, malaise/fatigue, weight gain and weight loss.   HENT: Negative for congestion, ear discharge, ear pain, hoarse voice, sore throat and tinnitus.    Eyes: Negative for blurred vision, discharge, pain, visual disturbance and visual halos.   Cardiovascular: Positive for dyspnea on exertion and irregular heartbeat. Negative for chest pain, claudication, leg swelling, orthopnea and paroxysmal nocturnal dyspnea.   Respiratory: Positive for shortness of breath. Negative for cough, sputum production and wheezing.    Endocrine: Negative for cold intolerance, heat intolerance, polydipsia, polyphagia and polyuria.   Hematologic/Lymphatic: Negative for adenopathy. Does not bruise/bleed easily.   Skin: Positive for itching and rash. Negative for dry skin, flushing, nail changes and suspicious lesions.   Musculoskeletal: Positive for falls and muscle weakness. Negative for arthritis, back pain, joint pain and myalgias.   Gastrointestinal: Positive for constipation. Negative for bloating, abdominal pain, diarrhea, dysphagia and hematemesis.   Genitourinary: Negative for bladder incontinence, dysuria, flank pain and frequency.   Neurological: Positive for weakness. Negative for aphonia, difficulty with concentration, disturbances in coordination, dizziness  and headaches.   Psychiatric/Behavioral: Positive for altered mental status. Negative for depression, memory loss, substance abuse, suicidal ideas and thoughts of violence. The patient does not have insomnia and is not nervous/anxious.    Allergic/Immunologic: Negative for environmental allergies, HIV exposure, hives and persistent infections.         Medications:     Allergies:   Allergies   Allergen Reactions   • Clindamycin/Lincomycin Unknown - High Severity   • Keflex [Cephalexin] Unknown - High Severity   • Penicillins Unknown - High Severity   • Sulfa Antibiotics Unknown - High Severity       Current Meds:   Current Facility-Administered Medications:   •  acetaminophen (TYLENOL) tablet 650 mg, 650 mg, Oral, Q4H PRN **OR** acetaminophen (TYLENOL) suppository 650 mg, 650 mg, Rectal, Q4H PRN, Jose Alfredo Mcbride MD  •  ALPRAZolam (XANAX) tablet 0.5 mg, 0.5 mg, Oral, BID PRN, Jose Alfredo Mcbride MD  •  apixaban (ELIQUIS) tablet 5 mg, 5 mg, Oral, Q12H, Jose Alfredo Mcbride MD  •  atorvastatin (LIPITOR) tablet 20 mg, 20 mg, Oral, Nightly, Jose Alfredo Mcbride MD  •  dextrose (D50W) (25 g/50 mL) IV injection 25 g, 25 g, Intravenous, Q15 Min PRN, Jose Alfredo Mcbride MD  •  dextrose (GLUTOSE) oral gel 15 g, 15 g, Oral, Q15 Min PRN, Jose Alfredo Mcbride MD  •  dilTIAZem (CARDIZEM) 125 mg in 125 mL NS infusion, 5-15 mg/hr, Intravenous, Titrated, Jose Alfredo Mcbride MD  •  docusate sodium (COLACE) capsule 100 mg, 100 mg, Oral, BID PRN, Jose Alfredo Mcbride MD  •  flecainide (TAMBOCOR) tablet 100 mg, 100 mg, Oral, Q12H, Jose Alfredo Mcbride MD  •  glucagon (human recombinant) (GLUCAGEN DIAGNOSTIC) injection 1 mg, 1 mg, Subcutaneous, Q15 Min PRN, Jose Alfredo Mcbride MD  •  guaiFENesin (MUCINEX) 12 hr tablet 1,200 mg, 1,200 mg, Oral, Q12H, Jose Alfredo Mcbride MD  •  hydrALAZINE (APRESOLINE) tablet 25 mg, 25 mg, Oral, Q8H, Jose Alfredo Mcbride MD  •  ipratropium (ATROVENT) nebulizer solution 0.5  mg, 0.5 mg, Nebulization, TID - RT, Jose Alfredo Mcbride MD  •  levalbuterol (XOPENEX) nebulizer solution 0.63 mg, 0.63 mg, Nebulization, TID - RT, Osiris Jackson APRN  •  losartan (COZAAR) tablet 100 mg, 100 mg, Oral, Q24H, Jose Alfredo Mcbride MD  •  metoprolol succinate XL (TOPROL-XL) 24 hr tablet 25 mg, 25 mg, Oral, Q24H, Jose Alfredo Mcbride MD  •  metoprolol tartrate (LOPRESSOR) injection 5 mg, 5 mg, Intravenous, Q4H PRN, Jose Alfredo Mcbride MD  •  montelukast (SINGULAIR) tablet 10 mg, 10 mg, Oral, Nightly, Jose Alfredo Mcbride MD  •  nystatin (MYCOSTATIN) powder, , Topical, Q12H, Jose Alfredo Mcbride MD  •  nystatin (MYCOSTATIN) powder, , Topical, BID PRN, Raoul Arndt MD  •  ondansetron (ZOFRAN) tablet 4 mg, 4 mg, Oral, Q6H PRN **OR** ondansetron (ZOFRAN) injection 4 mg, 4 mg, Intravenous, Q6H PRN, Jose Alfredo Mcbride MD  •  pantoprazole (PROTONIX) EC tablet 40 mg, 40 mg, Oral, BID AC, Jose Alfredo Mcbride MD  •  polyethylene glycol (MIRALAX) packet 17 g, 17 g, Oral, Daily PRN, Mena Salazar, APRN  •  potassium chloride (MICRO-K) CR capsule 20 mEq, 20 mEq, Oral, Daily, Jose Alfredo Mcbride MD  •  sennosides-docusate (PERICOLACE) 8.6-50 MG per tablet 2 tablet, 2 tablet, Oral, BID, Mena Salazar, KD  •  sodium chloride 0.9 % flush 10 mL, 10 mL, Intravenous, Q12H, Jose Alfredo Mcbride MD  •  sodium chloride 0.9 % flush 10 mL, 10 mL, Intravenous, PRN, Jose Alfredo Mcbride MD  •  torsemide (DEMADEX) tablet 20 mg, 20 mg, Oral, Daily, Jose Alfredo Mcbride MD  •  trolamine salicylate (ASPERCREME) 10 % cream 1 application, 1 application, Topical, PRN, Mena Salazar APRN  •  zolpidem (AMBIEN) tablet 5 mg, 5 mg, Oral, Nightly PRN, Jose Alfredo Mcbride MD    Data:     Code Status:    There are no questions and answers to display.       No family history on file.    Social History     Socioeconomic History   • Marital status:        Vitals:  Ht 162.6 cm  "(64\")   Wt 121 kg (267 lb 1.6 oz)   BMI 45.85 kg/m²   T 97.9 HR 67 RR 21 /58 SPO2 97% (4 lpm)          I/O (24Hr):  No intake or output data in the 24 hours ending 01/09/23 1006    Labs and imaging:      Recent Results (from the past 12 hour(s))   Basic Metabolic Panel    Collection Time: 01/09/23  4:37 AM    Specimen: Blood   Result Value Ref Range    Glucose 125 (H) 65 - 99 mg/dL    BUN 24 (H) 8 - 23 mg/dL    Creatinine 0.76 0.57 - 1.00 mg/dL    Sodium 136 136 - 145 mmol/L    Potassium 3.5 3.5 - 5.2 mmol/L    Chloride 94 (L) 98 - 107 mmol/L    CO2 30.0 (H) 22.0 - 29.0 mmol/L    Calcium 8.6 8.6 - 10.5 mg/dL    BUN/Creatinine Ratio 31.6 (H) 7.0 - 25.0    Anion Gap 12.0 5.0 - 15.0 mmol/L    eGFR 80.8 >60.0 mL/min/1.73   BNP    Collection Time: 01/09/23  4:37 AM    Specimen: Blood   Result Value Ref Range    proBNP 233.8 0.0 - 1,800.0 pg/mL   CBC Auto Differential    Collection Time: 01/09/23  4:37 AM    Specimen: Blood   Result Value Ref Range    WBC 8.98 3.40 - 10.80 10*3/mm3    RBC 4.42 3.77 - 5.28 10*6/mm3    Hemoglobin 12.4 12.0 - 15.9 g/dL    Hematocrit 40.0 34.0 - 46.6 %    MCV 90.5 79.0 - 97.0 fL    MCH 28.1 26.6 - 33.0 pg    MCHC 31.0 (L) 31.5 - 35.7 g/dL    RDW 15.9 (H) 12.3 - 15.4 %    RDW-SD 52.6 37.0 - 54.0 fl    MPV 10.4 6.0 - 12.0 fL    Platelets 183 140 - 450 10*3/mm3    Neutrophil % 69.4 42.7 - 76.0 %    Lymphocyte % 18.8 (L) 19.6 - 45.3 %    Monocyte % 7.1 5.0 - 12.0 %    Eosinophil % 2.3 0.3 - 6.2 %    Basophil % 0.6 0.0 - 1.5 %    Immature Grans % 1.8 (H) 0.0 - 0.5 %    Neutrophils, Absolute 6.23 1.70 - 7.00 10*3/mm3    Lymphocytes, Absolute 1.69 0.70 - 3.10 10*3/mm3    Monocytes, Absolute 0.64 0.10 - 0.90 10*3/mm3    Eosinophils, Absolute 0.21 0.00 - 0.40 10*3/mm3    Basophils, Absolute 0.05 0.00 - 0.20 10*3/mm3    Immature Grans, Absolute 0.16 (H) 0.00 - 0.05 10*3/mm3    nRBC 0.0 0.0 - 0.2 /100 WBC   Magnesium    Collection Time: 01/09/23  4:37 AM    Specimen: Blood   Result Value Ref " Range    Magnesium 2.9 (H) 1.6 - 2.4 mg/dL   POC Glucose Once    Collection Time: 01/09/23  7:15 AM    Specimen: Blood   Result Value Ref Range    Glucose 130 70 - 130 mg/dL         Physical Examination:        Physical Exam  Vitals and nursing note reviewed.   Constitutional:       Appearance: Normal appearance. She is obese.   HENT:      Head: Normocephalic and atraumatic.      Right Ear: External ear normal.      Left Ear: External ear normal.      Nose: Nose normal.      Mouth/Throat:      Mouth: Mucous membranes are moist.      Pharynx: Oropharynx is clear.   Eyes:      Extraocular Movements: Extraocular movements intact.      Conjunctiva/sclera: Conjunctivae normal.      Pupils: Pupils are equal, round, and reactive to light.   Cardiovascular:      Rate and Rhythm: Normal rate. Rhythm irregular.      Pulses: Normal pulses.      Heart sounds: Normal heart sounds.   Pulmonary:      Effort: Pulmonary effort is normal.      Breath sounds: Normal breath sounds.   Abdominal:      General: Bowel sounds are normal.      Palpations: Abdomen is soft.   Musculoskeletal:         General: Normal range of motion.      Cervical back: Normal range of motion and neck supple.      Comments: Generalized weakness   Skin:     General: Skin is warm and dry.      Capillary Refill: Capillary refill takes 2 to 3 seconds.      Findings: Erythema and rash present.   Neurological:      General: No focal deficit present.      Mental Status: She is alert and oriented to person, place, and time. Mental status is at baseline.      Motor: Weakness present.   Psychiatric:         Mood and Affect: Mood normal.         Behavior: Behavior normal.         Thought Content: Thought content normal.         Judgment: Judgment normal.           Assessment:          * No active hospital problems. *    No past medical history on file.     Plan:        1. Acute hypoxic respiratory failure  2. Acute exacerbation COPD  3. Pulmonary Emboli  4. Chronic atrial  fibrillation  5. Heart failure with preserved ejection fraction  6. Pulmonary fibrosis  7. Severe tracheomalacia  8. DVT s/p IVC filter placement  9. Leukocytosis - steroid induced?  10. Hypokalemia  11. Pulmonary hypertension     Continue current treatment. Monitor counts. Increase activity as tolerated. Labs in am. Oxygen weaning as tolerated. Maintain patient safety. Continue diuresis. Decrease hydralazine.     Electronically signed by KD Luna on 1/9/2023 at 10:06 CST     I have discussed the care of Keisha Resendiz, including pertinent history and exam findings, with the nurse practitioner.    I have seen and examined the patient and the key elements of all parts of the encounter have been performed by me.  I agree with the assessment, plan and orders as documented by KD Guillen, after I modified the exam findings and the plan of treatments and the final version is my approved version of the assessment.        Electronically signed by Jose Alfredo Mcbride MD on 1/9/2023 at 13:50 CST

## 2023-01-10 LAB
GLUCOSE BLDC GLUCOMTR-MCNC: 124 MG/DL (ref 70–130)
GLUCOSE BLDC GLUCOMTR-MCNC: 136 MG/DL (ref 70–130)
GLUCOSE BLDC GLUCOMTR-MCNC: 183 MG/DL (ref 70–130)

## 2023-01-10 PROCEDURE — 97530 THERAPEUTIC ACTIVITIES: CPT

## 2023-01-10 PROCEDURE — 63710000001 ONDANSETRON PER 8 MG: Performed by: INTERNAL MEDICINE

## 2023-01-10 PROCEDURE — 63710000001 LEVALBUTEROL PER 0.5 MG: Performed by: NURSE PRACTITIONER

## 2023-01-10 PROCEDURE — 97116 GAIT TRAINING THERAPY: CPT

## 2023-01-10 PROCEDURE — 82962 GLUCOSE BLOOD TEST: CPT

## 2023-01-10 NOTE — PROGRESS NOTES
FABBY Zendejas APRN        Internal Medicine Progress Note    1/10/2023   12:05 CST    Name:  Keisha Resendiz  MRN:    [id number]     Acct:     [id number]   Room:  [address] [place] [address] Day: 0     Admit Date: 12/29/2022  3:21 PM  PCP: Shannon Lilly PA-C    Subjective:     C/C: Oxygen weaning and rehabilitation efforts    Interval History: Status: Improved. Up to chair.  No family at bedside. Afebrile. Feeling better. Bowels moving. 02 sats stable with 02 at 4 lpm. Less right hip pain at this time. Blood sugars stable. No new concerns.     Review of Systems   Constitutional: Negative for chills, decreased appetite, fever, malaise/fatigue, weight gain and weight loss.   HENT: Negative for congestion, ear discharge, ear pain, hoarse voice, sore throat and tinnitus.    Eyes: Negative for blurred vision, discharge, pain, visual disturbance and visual halos.   Cardiovascular: Positive for dyspnea on exertion and irregular heartbeat. Negative for chest pain, claudication, leg swelling, orthopnea and paroxysmal nocturnal dyspnea.   Respiratory: Positive for shortness of breath. Negative for cough, sputum production and wheezing.    Endocrine: Negative for cold intolerance, heat intolerance, polydipsia, polyphagia and polyuria.   Hematologic/Lymphatic: Negative for adenopathy. Does not bruise/bleed easily.   Skin: Positive for itching and rash. Negative for dry skin, flushing, nail changes and suspicious lesions.   Musculoskeletal: Positive for falls and muscle weakness. Negative for arthritis, back pain, joint pain and myalgias.   Gastrointestinal: Positive for constipation. Negative for bloating, abdominal pain, diarrhea, dysphagia and hematemesis.   Genitourinary: Negative for bladder incontinence, dysuria, flank pain and frequency.   Neurological: Positive for weakness. Negative for aphonia, difficulty with concentration, disturbances in coordination, dizziness and headaches.    Psychiatric/Behavioral: Positive for altered mental status. Negative for depression, memory loss, substance abuse, suicidal ideas and thoughts of violence. The patient does not have insomnia and is not nervous/anxious.    Allergic/Immunologic: Negative for environmental allergies, HIV exposure, hives and persistent infections.         Medications:     Allergies:   Allergies   Allergen Reactions   • Clindamycin/Lincomycin Unknown - High Severity   • Keflex [Cephalexin] Unknown - High Severity   • Penicillins Unknown - High Severity   • Sulfa Antibiotics Unknown - High Severity       Current Meds:   Current Facility-Administered Medications:   •  acetaminophen (TYLENOL) tablet 650 mg, 650 mg, Oral, Q4H PRN **OR** acetaminophen (TYLENOL) suppository 650 mg, 650 mg, Rectal, Q4H PRN, Jose Alfredo Mcbride MD  •  ALPRAZolam (XANAX) tablet 0.5 mg, 0.5 mg, Oral, BID PRN, Jose Alfredo Mcbride MD  •  apixaban (ELIQUIS) tablet 5 mg, 5 mg, Oral, Q12H, Jose Alfredo Mcbride MD  •  atorvastatin (LIPITOR) tablet 20 mg, 20 mg, Oral, Nightly, Jose Alfredo Mcbride MD  •  dextrose (D50W) (25 g/50 mL) IV injection 25 g, 25 g, Intravenous, Q15 Min PRN, Jose Alfredo Mcbride MD  •  dextrose (GLUTOSE) oral gel 15 g, 15 g, Oral, Q15 Min PRN, Jose Alfredo Mcbride MD  •  dilTIAZem (CARDIZEM) 125 mg in 125 mL NS infusion, 5-15 mg/hr, Intravenous, Titrated, Jose Alfredo Mcbride MD  •  docusate sodium (COLACE) capsule 100 mg, 100 mg, Oral, BID PRN, Jose Alfredo Mcbride MD  •  flecainide (TAMBOCOR) tablet 100 mg, 100 mg, Oral, Q12H, Jose Alfredo Mcbride MD  •  glucagon (human recombinant) (GLUCAGEN DIAGNOSTIC) injection 1 mg, 1 mg, Subcutaneous, Q15 Min PRN, Jose Alfredo Mcbride MD  •  guaiFENesin (MUCINEX) 12 hr tablet 1,200 mg, 1,200 mg, Oral, Q12H, Jose Alfredo Mcbride MD  •  hydrALAZINE (APRESOLINE) tablet 10 mg, 10 mg, Oral, Q8H, Mena Salazar APRN  •  ipratropium (ATROVENT) nebulizer solution 0.5 mg, 0.5 mg,  "Nebulization, TID - RT, Jose Alfredo Mcbride MD  •  levalbuterol (XOPENEX) nebulizer solution 0.63 mg, 0.63 mg, Nebulization, TID - RT, Osiris Jackson APRN  •  losartan (COZAAR) tablet 100 mg, 100 mg, Oral, Q24H, Jose Alfredo Mcbride MD  •  metoprolol succinate XL (TOPROL-XL) 24 hr tablet 25 mg, 25 mg, Oral, Q24H, Jose Alfredo Mcbride MD  •  metoprolol tartrate (LOPRESSOR) injection 5 mg, 5 mg, Intravenous, Q4H PRN, Jose Alfredo Mcbride MD  •  montelukast (SINGULAIR) tablet 10 mg, 10 mg, Oral, Nightly, Jose Alfredo Mcbride MD  •  nystatin (MYCOSTATIN) powder, , Topical, Q12H, Jose Alfredo Mcbride MD  •  nystatin (MYCOSTATIN) powder, , Topical, BID PRN, Raoul Arndt MD  •  ondansetron (ZOFRAN) tablet 4 mg, 4 mg, Oral, Q6H PRN **OR** ondansetron (ZOFRAN) injection 4 mg, 4 mg, Intravenous, Q6H PRN, Jose Alfredo Mcbride MD  •  pantoprazole (PROTONIX) EC tablet 40 mg, 40 mg, Oral, BID AC, Jose Alfredo Mcbride MD  •  polyethylene glycol (MIRALAX) packet 17 g, 17 g, Oral, Daily PRN, Mena Salazar, APRN  •  potassium chloride (MICRO-K) CR capsule 20 mEq, 20 mEq, Oral, Daily, Jose Alfredo Mcbride MD  •  sennosides-docusate (PERICOLACE) 8.6-50 MG per tablet 2 tablet, 2 tablet, Oral, BID, Mena Salazar, APRN  •  sodium chloride 0.9 % flush 10 mL, 10 mL, Intravenous, Q12H, Jose Alfredo Mcbride MD  •  sodium chloride 0.9 % flush 10 mL, 10 mL, Intravenous, PRN, Jose Alfredo Mcbride MD  •  torsemide (DEMADEX) tablet 20 mg, 20 mg, Oral, Daily, Jose Alfredo Mcbride MD  •  trolamine salicylate (ASPERCREME) 10 % cream 1 application, 1 application, Topical, PRN, Mena Salazar APRN  •  zolpidem (AMBIEN) tablet 5 mg, 5 mg, Oral, Nightly PRN, Jose Alfredo Mcbride MD    Data:     Code Status:    There are no questions and answers to display.       No family history on file.    Social History     Socioeconomic History   • Marital status:        Vitals:  Ht 162.6 cm (64\")   Wt " 121 kg (267 lb 1.6 oz)   BMI 45.85 kg/m²   T 98.1 HR 63 RR 20 /59 SPO2 95% (4 lpm)          I/O (24Hr):  No intake or output data in the 24 hours ending 01/10/23 1205    Labs and imaging:      Recent Results (from the past 12 hour(s))   POC Glucose Once    Collection Time: 01/10/23  7:06 AM    Specimen: Blood   Result Value Ref Range    Glucose 124 70 - 130 mg/dL   POC Glucose Once    Collection Time: 01/10/23 11:11 AM    Specimen: Blood   Result Value Ref Range    Glucose 136 (H) 70 - 130 mg/dL         Physical Examination:        Physical Exam  Vitals and nursing note reviewed.   Constitutional:       Appearance: Normal appearance. She is obese.   HENT:      Head: Normocephalic and atraumatic.      Right Ear: External ear normal.      Left Ear: External ear normal.      Nose: Nose normal.      Mouth/Throat:      Mouth: Mucous membranes are moist.      Pharynx: Oropharynx is clear.   Eyes:      Extraocular Movements: Extraocular movements intact.      Conjunctiva/sclera: Conjunctivae normal.      Pupils: Pupils are equal, round, and reactive to light.   Cardiovascular:      Rate and Rhythm: Normal rate. Rhythm irregular.      Pulses: Normal pulses.      Heart sounds: Normal heart sounds.   Pulmonary:      Effort: Pulmonary effort is normal.      Breath sounds: Normal breath sounds.   Abdominal:      General: Bowel sounds are normal.      Palpations: Abdomen is soft.   Musculoskeletal:         General: Normal range of motion.      Cervical back: Normal range of motion and neck supple.      Comments: Generalized weakness   Skin:     General: Skin is warm and dry.      Capillary Refill: Capillary refill takes 2 to 3 seconds.      Findings: Erythema and rash present.   Neurological:      General: No focal deficit present.      Mental Status: She is alert and oriented to person, place, and time. Mental status is at baseline.      Motor: Weakness present.   Psychiatric:         Mood and Affect: Mood normal.          Behavior: Behavior normal.         Thought Content: Thought content normal.         Judgment: Judgment normal.           Assessment:          * No active hospital problems. *    No past medical history on file.     Plan:        1. Acute hypoxic respiratory failure  2. Acute exacerbation COPD  3. Pulmonary Emboli  4. Chronic atrial fibrillation  5. Heart failure with preserved ejection fraction  6. Pulmonary fibrosis  7. Severe tracheomalacia  8. DVT s/p IVC filter placement  9. Leukocytosis - steroid induced?  10. Hypokalemia  11. Pulmonary hypertension     Continue current treatment. Monitor counts. Increase activity as tolerated. Labs Thursday. Oxygen weaning as tolerated. Maintain patient safety. Continue diuresis. Monitor blood pressure closely. Continue bowel regimen. Discharge planning.     Electronically signed by KD Luna on 1/10/2023 at 12:05 CST

## 2023-01-11 LAB
GLUCOSE BLDC GLUCOMTR-MCNC: 106 MG/DL (ref 70–130)
GLUCOSE BLDC GLUCOMTR-MCNC: 183 MG/DL (ref 70–130)

## 2023-01-11 PROCEDURE — 25010000002 ONDANSETRON PER 1 MG: Performed by: INTERNAL MEDICINE

## 2023-01-11 PROCEDURE — 82962 GLUCOSE BLOOD TEST: CPT

## 2023-01-11 PROCEDURE — 63710000001 LEVALBUTEROL PER 0.5 MG: Performed by: NURSE PRACTITIONER

## 2023-01-11 PROCEDURE — 97116 GAIT TRAINING THERAPY: CPT

## 2023-01-11 RX ORDER — ALPRAZOLAM 0.5 MG/1
0.5 TABLET ORAL 2 TIMES DAILY PRN
Status: DISCONTINUED | OUTPATIENT
Start: 2023-01-11 | End: 2023-01-16 | Stop reason: HOSPADM

## 2023-01-11 RX ORDER — IPRATROPIUM BROMIDE AND ALBUTEROL SULFATE 2.5; .5 MG/3ML; MG/3ML
3 SOLUTION RESPIRATORY (INHALATION)
Status: DISCONTINUED | OUTPATIENT
Start: 2023-01-11 | End: 2023-01-16 | Stop reason: HOSPADM

## 2023-01-11 NOTE — PROGRESS NOTES
[facility] Fall Risk Assessment Note    Name: Keisha Resendiz  MRN: [id number]  CSN: [id number]  Admit Date/Time: 12/29/2022  3:21 PM.    Currently ordered medications associated with an increased risk for fall include:    Scheduled Meds:  flecainide, 100 mg, Oral, Q12H  hydrALAZINE, 10 mg, Oral, Q8H  losartan, 100 mg, Oral, Q24H  metoprolol succinate XL, 25 mg, Oral, Q24H  senna-docusate sodium, 2 tablet, Oral, BID  torsemide, 20 mg, Oral, Daily    PRN Meds:  •  ALPRAZolam  •  docusate sodium  •  metoprolol tartrate  •  ondansetron **OR** ondansetron  •  polyethylene glycol  •  zolpidem    Cesilia Buckley, Pharmacy Intern  01/11/23 13:33 CST

## 2023-01-11 NOTE — PROGRESS NOTES
Reed Mcbride M.D.  KD Guillen        Internal Medicine Progress Note    1/11/2023   10:04 CST    Name:  Keisha Resendiz  MRN:    [id number]     Acct:     [id number]   Room:  [address] [place] [address] Day: 0     Admit Date: 12/29/2022  3:21 PM  PCP: Shannon Lilly, ANTONETTE    Subjective:     C/C: Oxygen weaning and rehabilitation efforts    Interval History: Status: Improved. Resting in bed. No family at bedside. Afebrile. Feeling better. Bowels moving. 02 sats stable with 02 at 4 lpm. Less right hip pain at this time. Blood sugars stable. Discharge planning for transfer to SNF next week.     Review of Systems   Constitutional: Negative for chills, decreased appetite, fever, malaise/fatigue, weight gain and weight loss.   HENT: Negative for congestion, ear discharge, ear pain, hoarse voice, sore throat and tinnitus.    Eyes: Negative for blurred vision, discharge, pain, visual disturbance and visual halos.   Cardiovascular: Positive for dyspnea on exertion and irregular heartbeat. Negative for chest pain, claudication, leg swelling, orthopnea and paroxysmal nocturnal dyspnea.   Respiratory: Positive for shortness of breath. Negative for cough, sputum production and wheezing.    Endocrine: Negative for cold intolerance, heat intolerance, polydipsia, polyphagia and polyuria.   Hematologic/Lymphatic: Negative for adenopathy. Does not bruise/bleed easily.   Skin: Positive for itching and rash. Negative for dry skin, flushing, nail changes and suspicious lesions.   Musculoskeletal: Positive for falls and muscle weakness. Negative for arthritis, back pain, joint pain and myalgias.   Gastrointestinal: Positive for constipation. Negative for bloating, abdominal pain, diarrhea, dysphagia and hematemesis.   Genitourinary: Negative for bladder incontinence, dysuria, flank pain and frequency.   Neurological: Positive for weakness. Negative for aphonia, difficulty with concentration, disturbances in  coordination, dizziness and headaches.   Psychiatric/Behavioral: Positive for altered mental status. Negative for depression, memory loss, substance abuse, suicidal ideas and thoughts of violence. The patient does not have insomnia and is not nervous/anxious.    Allergic/Immunologic: Negative for environmental allergies, HIV exposure, hives and persistent infections.         Medications:     Allergies:   Allergies   Allergen Reactions   • Clindamycin/Lincomycin Unknown - High Severity   • Keflex [Cephalexin] Unknown - High Severity   • Penicillins Unknown - High Severity   • Sulfa Antibiotics Unknown - High Severity       Current Meds:   Current Facility-Administered Medications:   •  acetaminophen (TYLENOL) tablet 650 mg, 650 mg, Oral, Q4H PRN **OR** acetaminophen (TYLENOL) suppository 650 mg, 650 mg, Rectal, Q4H PRN, Jose Alfredo Mcbride MD  •  ALPRAZolam (XANAX) tablet 0.5 mg, 0.5 mg, Oral, BID PRN, Jose Alfredo Mcbride MD  •  apixaban (ELIQUIS) tablet 5 mg, 5 mg, Oral, Q12H, Jose Alfredo Mcbride MD  •  atorvastatin (LIPITOR) tablet 20 mg, 20 mg, Oral, Nightly, Jose Alfredo Mcbride MD  •  dextrose (D50W) (25 g/50 mL) IV injection 25 g, 25 g, Intravenous, Q15 Min PRN, Jose Alfredo Mcbride MD  •  dextrose (GLUTOSE) oral gel 15 g, 15 g, Oral, Q15 Min PRN, Jose Alfredo Mcbride MD  •  dilTIAZem (CARDIZEM) 125 mg in 125 mL NS infusion, 5-15 mg/hr, Intravenous, Titrated, Jose Alfredo Mcbride MD  •  docusate sodium (COLACE) capsule 100 mg, 100 mg, Oral, BID PRN, Jose Alfredo Mcbride MD  •  flecainide (TAMBOCOR) tablet 100 mg, 100 mg, Oral, Q12H, Jose Alfredo Mcbride MD  •  glucagon (human recombinant) (GLUCAGEN DIAGNOSTIC) injection 1 mg, 1 mg, Subcutaneous, Q15 Min PRN, Jose Alfredo Mcbride MD  •  guaiFENesin (MUCINEX) 12 hr tablet 1,200 mg, 1,200 mg, Oral, Q12H, Jose Alfredo Mcbride MD  •  hydrALAZINE (APRESOLINE) tablet 10 mg, 10 mg, Oral, Q8H, Mena Salazare, APRN  •  ipratropium  (ATROVENT) nebulizer solution 0.5 mg, 0.5 mg, Nebulization, TID - RT, Jose Alfredo Mcbride MD  •  levalbuterol (XOPENEX) nebulizer solution 0.63 mg, 0.63 mg, Nebulization, TID - RT, Osiris Jackson APRN  •  losartan (COZAAR) tablet 100 mg, 100 mg, Oral, Q24H, Jose Alfredo Mcbride MD  •  metoprolol succinate XL (TOPROL-XL) 24 hr tablet 25 mg, 25 mg, Oral, Q24H, Jose Alfredo Mcbride MD  •  metoprolol tartrate (LOPRESSOR) injection 5 mg, 5 mg, Intravenous, Q4H PRN, Jose Alfredo Mcbride MD  •  montelukast (SINGULAIR) tablet 10 mg, 10 mg, Oral, Nightly, Jose Alfredo Mcbride MD  •  nystatin (MYCOSTATIN) powder, , Topical, Q12H, Jose Alfredo Mcbride MD  •  nystatin (MYCOSTATIN) powder, , Topical, BID PRN, Raoul Arndt MD  •  ondansetron (ZOFRAN) tablet 4 mg, 4 mg, Oral, Q6H PRN **OR** ondansetron (ZOFRAN) injection 4 mg, 4 mg, Intravenous, Q6H PRN, Jose Alfredo Mcbride MD  •  pantoprazole (PROTONIX) EC tablet 40 mg, 40 mg, Oral, BID AC, Jose Alfredo Mcbride MD  •  polyethylene glycol (MIRALAX) packet 17 g, 17 g, Oral, Daily PRN, Mena Salazar, KD  •  potassium chloride (MICRO-K) CR capsule 20 mEq, 20 mEq, Oral, Daily, Jose Alfredo Mcbride MD  •  sennosides-docusate (PERICOLACE) 8.6-50 MG per tablet 2 tablet, 2 tablet, Oral, BID, Mena Salazar, KD  •  sodium chloride 0.9 % flush 10 mL, 10 mL, Intravenous, Q12H, Jose Alfredo Mcbride MD  •  sodium chloride 0.9 % flush 10 mL, 10 mL, Intravenous, PRN, Jose Alfredo Mcbride MD  •  torsemide (DEMADEX) tablet 20 mg, 20 mg, Oral, Daily, Jose Alfredo Mcbride MD  •  trolamine salicylate (ASPERCREME) 10 % cream 1 application, 1 application, Topical, PRN, Mena Salazar APRN  •  zolpidem (AMBIEN) tablet 5 mg, 5 mg, Oral, Nightly PRN, Jose Alfredo Mcbride MD    Data:     Code Status:    There are no questions and answers to display.       No family history on file.    Social History     Socioeconomic History   • Marital status:  "       Vitals:  Ht 162.6 cm (64\")   Wt 121 kg (267 lb 1.6 oz)   BMI 45.85 kg/m²   T 97.6 HR 73 RR 16 /46 SPO2 91% (4 lpm)          I/O (24Hr):  No intake or output data in the 24 hours ending 01/11/23 1004    Labs and imaging:      Recent Results (from the past 12 hour(s))   POC Glucose Once    Collection Time: 01/11/23  7:07 AM    Specimen: Blood   Result Value Ref Range    Glucose 106 70 - 130 mg/dL         Physical Examination:        Physical Exam  Vitals and nursing note reviewed.   Constitutional:       Appearance: Normal appearance. She is obese.   HENT:      Head: Normocephalic and atraumatic.      Right Ear: External ear normal.      Left Ear: External ear normal.      Nose: Nose normal.      Mouth/Throat:      Mouth: Mucous membranes are moist.      Pharynx: Oropharynx is clear.   Eyes:      Extraocular Movements: Extraocular movements intact.      Conjunctiva/sclera: Conjunctivae normal.      Pupils: Pupils are equal, round, and reactive to light.   Cardiovascular:      Rate and Rhythm: Normal rate. Rhythm irregular.      Pulses: Normal pulses.      Heart sounds: Normal heart sounds.   Pulmonary:      Effort: Pulmonary effort is normal.      Breath sounds: Normal breath sounds.   Abdominal:      General: Bowel sounds are normal.      Palpations: Abdomen is soft.   Musculoskeletal:         General: Normal range of motion.      Cervical back: Normal range of motion and neck supple.      Comments: Generalized weakness   Skin:     General: Skin is warm and dry.      Capillary Refill: Capillary refill takes 2 to 3 seconds.      Findings: Erythema and rash present.   Neurological:      General: No focal deficit present.      Mental Status: She is alert and oriented to person, place, and time. Mental status is at baseline.      Motor: Weakness present.   Psychiatric:         Mood and Affect: Mood normal.         Behavior: Behavior normal.         Thought Content: Thought content normal.         " Judgment: Judgment normal.           Assessment:          * No active hospital problems. *    No past medical history on file.     Plan:        1. Acute hypoxic respiratory failure  2. Acute exacerbation COPD  3. Pulmonary Emboli  4. Chronic atrial fibrillation  5. Heart failure with preserved ejection fraction  6. Pulmonary fibrosis  7. Severe tracheomalacia  8. DVT s/p IVC filter placement  9. Leukocytosis - steroid induced?  10. Hypokalemia  11. Pulmonary hypertension     Continue current treatment. Monitor counts. Increase activity as tolerated. Labs in am. Oxygen weaning as tolerated. Maintain patient safety. Continue diuresis. Monitor blood pressure closely. Continue bowel regimen. Discharge planning.     Electronically signed by KD Luna on 1/11/2023 at 10:04 CST     I have discussed the care of Keisha Resendiz, including pertinent history and exam findings, with the nurse practitioner.    I have seen and examined the patient and the key elements of all parts of the encounter have been performed by me.  I agree with the assessment, plan and orders as documented by KD Guillen, after I modified the exam findings and the plan of treatments and the final version is my approved version of the assessment.        Electronically signed by Jose Alfredo Mcbride MD on 1/11/2023 at 21:22 CST

## 2023-01-12 LAB
ANION GAP SERPL CALCULATED.3IONS-SCNC: 11 MMOL/L (ref 5–15)
BASOPHILS # BLD AUTO: 0.04 10*3/MM3 (ref 0–0.2)
BASOPHILS NFR BLD AUTO: 0.6 % (ref 0–1.5)
BUN SERPL-MCNC: 14 MG/DL (ref 8–23)
BUN/CREAT SERPL: 26.4 (ref 7–25)
CALCIUM SPEC-SCNC: 8.5 MG/DL (ref 8.6–10.5)
CHLORIDE SERPL-SCNC: 98 MMOL/L (ref 98–107)
CO2 SERPL-SCNC: 30 MMOL/L (ref 22–29)
CREAT SERPL-MCNC: 0.53 MG/DL (ref 0.57–1)
DEPRECATED RDW RBC AUTO: 53.2 FL (ref 37–54)
EGFRCR SERPLBLD CKD-EPI 2021: 95.4 ML/MIN/1.73
EOSINOPHIL # BLD AUTO: 0.18 10*3/MM3 (ref 0–0.4)
EOSINOPHIL NFR BLD AUTO: 2.7 % (ref 0.3–6.2)
ERYTHROCYTE [DISTWIDTH] IN BLOOD BY AUTOMATED COUNT: 16 % (ref 12.3–15.4)
GLUCOSE SERPL-MCNC: 102 MG/DL (ref 65–99)
HCT VFR BLD AUTO: 37.8 % (ref 34–46.6)
HGB BLD-MCNC: 11.5 G/DL (ref 12–15.9)
IMM GRANULOCYTES # BLD AUTO: 0.07 10*3/MM3 (ref 0–0.05)
IMM GRANULOCYTES NFR BLD AUTO: 1.1 % (ref 0–0.5)
LYMPHOCYTES # BLD AUTO: 1.66 10*3/MM3 (ref 0.7–3.1)
LYMPHOCYTES NFR BLD AUTO: 25 % (ref 19.6–45.3)
MCH RBC QN AUTO: 27.7 PG (ref 26.6–33)
MCHC RBC AUTO-ENTMCNC: 30.4 G/DL (ref 31.5–35.7)
MCV RBC AUTO: 91.1 FL (ref 79–97)
MONOCYTES # BLD AUTO: 0.53 10*3/MM3 (ref 0.1–0.9)
MONOCYTES NFR BLD AUTO: 8 % (ref 5–12)
NEUTROPHILS NFR BLD AUTO: 4.15 10*3/MM3 (ref 1.7–7)
NEUTROPHILS NFR BLD AUTO: 62.6 % (ref 42.7–76)
NRBC BLD AUTO-RTO: 0 /100 WBC (ref 0–0.2)
NT-PROBNP SERPL-MCNC: 484.9 PG/ML (ref 0–1800)
PLATELET # BLD AUTO: 180 10*3/MM3 (ref 140–450)
PMV BLD AUTO: 11 FL (ref 6–12)
POTASSIUM SERPL-SCNC: 3.7 MMOL/L (ref 3.5–5.2)
RBC # BLD AUTO: 4.15 10*6/MM3 (ref 3.77–5.28)
SODIUM SERPL-SCNC: 139 MMOL/L (ref 136–145)
WBC NRBC COR # BLD: 6.63 10*3/MM3 (ref 3.4–10.8)

## 2023-01-12 PROCEDURE — 25010000002 ONDANSETRON PER 1 MG: Performed by: INTERNAL MEDICINE

## 2023-01-12 PROCEDURE — 97530 THERAPEUTIC ACTIVITIES: CPT

## 2023-01-12 PROCEDURE — 85025 COMPLETE CBC W/AUTO DIFF WBC: CPT | Performed by: INTERNAL MEDICINE

## 2023-01-12 PROCEDURE — 97110 THERAPEUTIC EXERCISES: CPT

## 2023-01-12 PROCEDURE — 97116 GAIT TRAINING THERAPY: CPT

## 2023-01-12 PROCEDURE — 80048 BASIC METABOLIC PNL TOTAL CA: CPT | Performed by: INTERNAL MEDICINE

## 2023-01-12 PROCEDURE — 83880 ASSAY OF NATRIURETIC PEPTIDE: CPT | Performed by: INTERNAL MEDICINE

## 2023-01-12 PROCEDURE — 97535 SELF CARE MNGMENT TRAINING: CPT

## 2023-01-12 NOTE — PROGRESS NOTES
[facility]  FABBY Sung APRN        Internal Medicine Progress Note    1/12/2023   11:33 CST    Name:  Keisha Resendiz  MRN:    [id number]     Acct:     [id number]   Room:  [address] [place] [address] Day: 0     Admit Date: 12/29/2022  3:21 PM  PCP: Shannon Lilly PA-C    Subjective:     C/C: Oxygen weaning and rehabilitation efforts    Interval History: Status: Improved. Resting in bed. No family at bedside. Afebrile. Feeling better. Bowels moving - now having loose stools. 02 sats stable with 02 at 4 lpm. Less right hip pain at this time. Counts stable. Discharge planning for transfer to SNF next week.     Review of Systems   Constitutional: Negative for chills, decreased appetite, fever, malaise/fatigue, weight gain and weight loss.   HENT: Negative for congestion, ear discharge, ear pain, hoarse voice, sore throat and tinnitus.    Eyes: Negative for blurred vision, discharge, pain, visual disturbance and visual halos.   Cardiovascular: Positive for dyspnea on exertion and irregular heartbeat. Negative for chest pain, claudication, leg swelling, orthopnea and paroxysmal nocturnal dyspnea.   Respiratory: Positive for shortness of breath. Negative for cough, sputum production and wheezing.    Endocrine: Negative for cold intolerance, heat intolerance, polydipsia, polyphagia and polyuria.   Hematologic/Lymphatic: Negative for adenopathy. Does not bruise/bleed easily.   Skin: Positive for itching and rash. Negative for dry skin, flushing, nail changes and suspicious lesions.   Musculoskeletal: Positive for falls and muscle weakness. Negative for arthritis, back pain, joint pain and myalgias.   Gastrointestinal: Positive for constipation. Negative for bloating, abdominal pain, diarrhea, dysphagia and hematemesis.   Genitourinary: Negative for bladder incontinence, dysuria, flank pain and frequency.   Neurological: Positive for weakness. Negative for aphonia, difficulty with concentration,  disturbances in coordination, dizziness and headaches.   Psychiatric/Behavioral: Positive for altered mental status. Negative for depression, memory loss, substance abuse, suicidal ideas and thoughts of violence. The patient does not have insomnia and is not nervous/anxious.    Allergic/Immunologic: Negative for environmental allergies, HIV exposure, hives and persistent infections.         Medications:     Allergies:   Allergies   Allergen Reactions   • Clindamycin/Lincomycin Unknown - High Severity   • Keflex [Cephalexin] Unknown - High Severity   • Penicillins Unknown - High Severity   • Sulfa Antibiotics Unknown - High Severity       Current Meds:   Current Facility-Administered Medications:   •  acetaminophen (TYLENOL) tablet 650 mg, 650 mg, Oral, Q4H PRN **OR** acetaminophen (TYLENOL) suppository 650 mg, 650 mg, Rectal, Q4H PRN, Jose Alfredo Mcbride MD  •  ALPRAZolam (XANAX) tablet 0.5 mg, 0.5 mg, Oral, BID PRN, Jose Alfredo Mcbride MD  •  apixaban (ELIQUIS) tablet 5 mg, 5 mg, Oral, Q12H, Jose Alfredo Mcbride MD  •  atorvastatin (LIPITOR) tablet 20 mg, 20 mg, Oral, Nightly, Jose Alfredo Mcbride MD  •  dextrose (D50W) (25 g/50 mL) IV injection 25 g, 25 g, Intravenous, Q15 Min PRN, Jose Alfredo Mcbride MD  •  dextrose (GLUTOSE) oral gel 15 g, 15 g, Oral, Q15 Min PRN, Jose Alfredo Mcbride MD  •  dilTIAZem (CARDIZEM) 125 mg in 125 mL NS infusion, 5-15 mg/hr, Intravenous, Titrated, Jose Alfredo Mcbride MD  •  docusate sodium (COLACE) capsule 100 mg, 100 mg, Oral, BID PRN, Jose Alfredo Mcbride MD  •  flecainide (TAMBOCOR) tablet 100 mg, 100 mg, Oral, Q12H, Jose Alfredo Mcbride MD  •  glucagon (human recombinant) (GLUCAGEN DIAGNOSTIC) injection 1 mg, 1 mg, Subcutaneous, Q15 Min PRN, Jose Alfredo Mcbride MD  •  guaiFENesin (MUCINEX) 12 hr tablet 1,200 mg, 1,200 mg, Oral, Q12H, Jose Alfredo Mcbride MD  •  hydrALAZINE (APRESOLINE) tablet 10 mg, 10 mg, Oral, Q8H, Mena Salazar, APRN  •   "ipratropium-albuterol (DUO-NEB) nebulizer solution 3 mL, 3 mL, Nebulization, TID - RT, Jose Alfredo Mcbride MD  •  losartan (COZAAR) tablet 100 mg, 100 mg, Oral, Q24H, Jose Alfredo Mcbride MD  •  metoprolol succinate XL (TOPROL-XL) 24 hr tablet 25 mg, 25 mg, Oral, Q24H, Jose Alfredo Mcbride MD  •  metoprolol tartrate (LOPRESSOR) injection 5 mg, 5 mg, Intravenous, Q4H PRN, Jose Alfredo Mcbride MD  •  montelukast (SINGULAIR) tablet 10 mg, 10 mg, Oral, Nightly, Jose Alfredo Mcbride MD  •  nystatin (MYCOSTATIN) powder, , Topical, Q12H, Jose Alfredo Mcbride MD  •  nystatin (MYCOSTATIN) powder, , Topical, BID PRN, Raoul Arndt MD  •  ondansetron (ZOFRAN) tablet 4 mg, 4 mg, Oral, Q6H PRN **OR** ondansetron (ZOFRAN) injection 4 mg, 4 mg, Intravenous, Q6H PRN, Jose Alfredo Mcbride MD  •  pantoprazole (PROTONIX) EC tablet 40 mg, 40 mg, Oral, BID AC, Jose Alfredo Mcbride MD  •  polyethylene glycol (MIRALAX) packet 17 g, 17 g, Oral, Daily PRN, Mena Salazar, KD  •  potassium chloride (MICRO-K) CR capsule 20 mEq, 20 mEq, Oral, Daily, Jose Alfredo Mcbride MD  •  sennosides-docusate (PERICOLACE) 8.6-50 MG per tablet 2 tablet, 2 tablet, Oral, BID, Mena Salazar, KD  •  sodium chloride 0.9 % flush 10 mL, 10 mL, Intravenous, Q12H, Jose Alfredo Mcbride MD  •  sodium chloride 0.9 % flush 10 mL, 10 mL, Intravenous, PRN, Jose Alfredo Mcbride MD  •  torsemide (DEMADEX) tablet 20 mg, 20 mg, Oral, Daily, Jose Alfredo Mcbride MD  •  trolamine salicylate (ASPERCREME) 10 % cream 1 application, 1 application, Topical, PRN, Mena Salazar APRN  •  zolpidem (AMBIEN) tablet 5 mg, 5 mg, Oral, Nightly Reed LAWLER Richard Scott, MD    Data:     Code Status:    There are no questions and answers to display.       No family history on file.    Social History     Socioeconomic History   • Marital status:        Vitals:  Ht 162.6 cm (64\")   Wt 121 kg (267 lb 1.6 oz)   BMI 45.85 kg/m²   T " 98.2 HR 76 RR 16 /51 SPO2 93% (4 lpm)          I/O (24Hr):  No intake or output data in the 24 hours ending 01/12/23 1133    Labs and imaging:      Recent Results (from the past 12 hour(s))   Basic Metabolic Panel    Collection Time: 01/12/23  5:42 AM    Specimen: Blood   Result Value Ref Range    Glucose 102 (H) 65 - 99 mg/dL    BUN 14 8 - 23 mg/dL    Creatinine 0.53 (L) 0.57 - 1.00 mg/dL    Sodium 139 136 - 145 mmol/L    Potassium 3.7 3.5 - 5.2 mmol/L    Chloride 98 98 - 107 mmol/L    CO2 30.0 (H) 22.0 - 29.0 mmol/L    Calcium 8.5 (L) 8.6 - 10.5 mg/dL    BUN/Creatinine Ratio 26.4 (H) 7.0 - 25.0    Anion Gap 11.0 5.0 - 15.0 mmol/L    eGFR 95.4 >60.0 mL/min/1.73   BNP    Collection Time: 01/12/23  5:42 AM    Specimen: Blood   Result Value Ref Range    proBNP 484.9 0.0 - 1,800.0 pg/mL   CBC Auto Differential    Collection Time: 01/12/23  5:42 AM    Specimen: Blood   Result Value Ref Range    WBC 6.63 3.40 - 10.80 10*3/mm3    RBC 4.15 3.77 - 5.28 10*6/mm3    Hemoglobin 11.5 (L) 12.0 - 15.9 g/dL    Hematocrit 37.8 34.0 - 46.6 %    MCV 91.1 79.0 - 97.0 fL    MCH 27.7 26.6 - 33.0 pg    MCHC 30.4 (L) 31.5 - 35.7 g/dL    RDW 16.0 (H) 12.3 - 15.4 %    RDW-SD 53.2 37.0 - 54.0 fl    MPV 11.0 6.0 - 12.0 fL    Platelets 180 140 - 450 10*3/mm3    Neutrophil % 62.6 42.7 - 76.0 %    Lymphocyte % 25.0 19.6 - 45.3 %    Monocyte % 8.0 5.0 - 12.0 %    Eosinophil % 2.7 0.3 - 6.2 %    Basophil % 0.6 0.0 - 1.5 %    Immature Grans % 1.1 (H) 0.0 - 0.5 %    Neutrophils, Absolute 4.15 1.70 - 7.00 10*3/mm3    Lymphocytes, Absolute 1.66 0.70 - 3.10 10*3/mm3    Monocytes, Absolute 0.53 0.10 - 0.90 10*3/mm3    Eosinophils, Absolute 0.18 0.00 - 0.40 10*3/mm3    Basophils, Absolute 0.04 0.00 - 0.20 10*3/mm3    Immature Grans, Absolute 0.07 (H) 0.00 - 0.05 10*3/mm3    nRBC 0.0 0.0 - 0.2 /100 WBC         Physical Examination:        Physical Exam  Vitals and nursing note reviewed.   Constitutional:       Appearance: Normal appearance. She is  obese.   HENT:      Head: Normocephalic and atraumatic.      Right Ear: External ear normal.      Left Ear: External ear normal.      Nose: Nose normal.      Mouth/Throat:      Mouth: Mucous membranes are moist.      Pharynx: Oropharynx is clear.   Eyes:      Extraocular Movements: Extraocular movements intact.      Conjunctiva/sclera: Conjunctivae normal.      Pupils: Pupils are equal, round, and reactive to light.   Cardiovascular:      Rate and Rhythm: Normal rate. Rhythm irregular.      Pulses: Normal pulses.      Heart sounds: Normal heart sounds.   Pulmonary:      Effort: Pulmonary effort is normal.      Breath sounds: Normal breath sounds.   Abdominal:      General: Bowel sounds are normal.      Palpations: Abdomen is soft.   Musculoskeletal:         General: Normal range of motion.      Cervical back: Normal range of motion and neck supple.      Comments: Generalized weakness   Skin:     General: Skin is warm and dry.      Capillary Refill: Capillary refill takes 2 to 3 seconds.      Findings: Erythema and rash present.   Neurological:      General: No focal deficit present.      Mental Status: She is alert and oriented to person, place, and time. Mental status is at baseline.      Motor: Weakness present.   Psychiatric:         Mood and Affect: Mood normal.         Behavior: Behavior normal.         Thought Content: Thought content normal.         Judgment: Judgment normal.           Assessment:          * No active hospital problems. *    No past medical history on file.     Plan:        1. Acute hypoxic respiratory failure  2. Acute exacerbation COPD  3. Pulmonary Emboli  4. Chronic atrial fibrillation  5. Heart failure with preserved ejection fraction  6. Pulmonary fibrosis  7. Severe tracheomalacia  8. DVT s/p IVC filter placement  9. Leukocytosis - steroid induced?  10. Hypokalemia  11. Pulmonary hypertension     Continue current treatment. Monitor counts. Increase activity as tolerated. Labs Monday.  Oxygen weaning as tolerated. Maintain patient safety. Continue diuresis. Monitor blood pressure closely. Continue bowel regimen. Discharge planning.     Electronically signed by KD Luna on 1/12/2023 at 11:33 CST     I have discussed the care of Keisha Resendiz, including pertinent history and exam findings, with the nurse practitioner.    I have seen and examined the patient and the key elements of all parts of the encounter have been performed by me.  I agree with the assessment, plan and orders as documented by KD Guillen, after I modified the exam findings and the plan of treatments and the final version is my approved version of the assessment.        Electronically signed by Jose Alfredo Mcbride MD on 1/12/2023 at 13:58 CST

## 2023-01-12 NOTE — PROGRESS NOTES
"Adult Nutrition  Assessment/PES    Patient Name:  Keisha Resendiz  YOB: 1945  MRN: [id number]  Admit Date:  12/29/2022    Assessment Date:  1/12/2023   Reason for Assessment     Row Name 01/12/23 1440          Reason for Assessment    Reason For Assessment follow-up protocol     Diagnosis cardiac disease;pulmonary disease                Nutrition/Diet History     Row Name 01/12/23 1440          Nutrition/Diet History    Typical Intake (Food/Fluid/EN/PN) States appetite is \"too good\". Complimentary of food. Menu in room. Verbalizes daily menu selections. BM 1/11. Glu ranges 106-183. Wt 267.1lb; gain from admission but possible scale error.                Labs/Tests/Procedures/Meds     Row Name 01/12/23 1452          Labs/Procedures/Meds    Lab Results Reviewed reviewed     Lab Results Comments Glu 106-183        Diagnostic Tests/Procedures    Diagnostic Test/Procedure Reviewed reviewed        Medications    Pertinent Medications Reviewed reviewed     Pertinent Medications Comments See MAR                Physical Findings     Row Name 01/12/23 1452          Physical Findings    Overall Physical Appearance NC, no edema, BM 1/11.                Estimated/Assessed Needs - Anthropometrics     Row Name 01/12/23 1452          Anthropometrics    Weight for Calculation 111 kg (244 lb 11.4 oz)        Estimated/Assessed Needs    Additional Documentation Fluid Requirements (Group);KCAL/KG (Group);Protein Requirements (Group)        KCAL/KG    KCAL/KG 14 Kcal/Kg (kcal)     14 Kcal/Kg (kcal) 1554        Protein Requirements    Weight Used For Protein Calculations 54.4 kg (120 lb)  IBW     Est Protein Requirement Amount (gms/kg) 1.4 gm protein     Estimated Protein Requirements (gms/day) 76.2        Fluid Requirements    Fluid Requirements (mL/day) 1633     RDA Method (mL) 1633                Nutrition Prescription Ordered     Row Name 01/12/23 1452          Nutrition Prescription PO    Current PO Diet Regular     " Fluid Consistency Thin                Evaluation of Received Nutrient/Fluid Intake     Row Name 01/12/23 1453          Nutrient/Fluid Evaluation    Number of Days Evaluated 3 days        Fluid Intake Evaluation    Oral Fluid (mL) 1680        PO Evaluation    Number of Days PO Intake Evaluated 3 days     Number of Meals 6     % PO Intake 93                   Problem/Interventions:   Problem 1     Row Name 01/12/23 1453          Nutrition Diagnoses Problem 1    Problem 1 Nutrition Appropriate for Condition at this Time                      Intervention Goal     Row Name 01/12/23 1453          Intervention Goal    General Disease management/therapy;Meet nutritional needs for age/condition     PO Meet estimated needs;Maintain intake     Weight Appropriate weight loss                Nutrition Intervention     Row Name 01/12/23 1453          Nutrition Intervention    RD/Tech Action Follow Tx progress;Care plan reviewd                Nutrition Prescription     Row Name 01/12/23 1453          Nutrition Prescription PO    PO Prescription Other (comment)  continue same protocol                Education/Evaluation     Row Name 01/12/23 1453          Education    Education No discharge needs identified at this time        Monitor/Evaluation    Monitor Per protocol                 Electronically signed by:  Juana Giles RD  01/12/23 14:53 CST   yes

## 2023-01-13 LAB — GLUCOSE BLDC GLUCOMTR-MCNC: 121 MG/DL (ref 70–130)

## 2023-01-13 PROCEDURE — 97168 OT RE-EVAL EST PLAN CARE: CPT

## 2023-01-13 PROCEDURE — 25010000002 ONDANSETRON PER 1 MG: Performed by: INTERNAL MEDICINE

## 2023-01-13 PROCEDURE — 82962 GLUCOSE BLOOD TEST: CPT

## 2023-01-13 PROCEDURE — 97530 THERAPEUTIC ACTIVITIES: CPT

## 2023-01-13 PROCEDURE — 97116 GAIT TRAINING THERAPY: CPT

## 2023-01-13 PROCEDURE — 63710000001 ONDANSETRON PER 8 MG: Performed by: INTERNAL MEDICINE

## 2023-01-13 NOTE — PROGRESS NOTES
FABBY Zendejas APRN        Internal Medicine Progress Note    1/13/2023   09:47 CST    Name:  Keisha Resendiz  MRN:    [id number]     Acct:     [id number]   Room:  [address] [place] [address] Day: 0     Admit Date: 12/29/2022  3:21 PM  PCP: Shannon Lilly PA-C    Subjective:     C/C: Oxygen weaning and rehabilitation efforts    Interval History: Status: Improved. Resting in bed. No family at bedside. Afebrile. Feeling better. Bowels moving - now having loose stools. 02 sats stable with 02 at 4 lpm. Requiring increased 02 to maintain adequate sats with activity.  Less right hip pain at this time.  Discharge planning for transfer to SNF next week.     Review of Systems   Constitutional: Negative for chills, decreased appetite, fever, malaise/fatigue, weight gain and weight loss.   HENT: Negative for congestion, ear discharge, ear pain, hoarse voice, sore throat and tinnitus.    Eyes: Negative for blurred vision, discharge, pain, visual disturbance and visual halos.   Cardiovascular: Positive for dyspnea on exertion and irregular heartbeat. Negative for chest pain, claudication, leg swelling, orthopnea and paroxysmal nocturnal dyspnea.   Respiratory: Positive for shortness of breath. Negative for cough, sputum production and wheezing.    Endocrine: Negative for cold intolerance, heat intolerance, polydipsia, polyphagia and polyuria.   Hematologic/Lymphatic: Negative for adenopathy. Does not bruise/bleed easily.   Skin: Positive for itching and rash. Negative for dry skin, flushing, nail changes and suspicious lesions.   Musculoskeletal: Positive for falls and muscle weakness. Negative for arthritis, back pain, joint pain and myalgias.   Gastrointestinal: Positive for constipation. Negative for bloating, abdominal pain, diarrhea, dysphagia and hematemesis.   Genitourinary: Negative for bladder incontinence, dysuria, flank pain and frequency.   Neurological: Positive for weakness. Negative  for aphonia, difficulty with concentration, disturbances in coordination, dizziness and headaches.   Psychiatric/Behavioral: Positive for altered mental status. Negative for depression, memory loss, substance abuse, suicidal ideas and thoughts of violence. The patient does not have insomnia and is not nervous/anxious.    Allergic/Immunologic: Negative for environmental allergies, HIV exposure, hives and persistent infections.         Medications:     Allergies:   Allergies   Allergen Reactions   • Clindamycin/Lincomycin Unknown - High Severity   • Keflex [Cephalexin] Unknown - High Severity   • Penicillins Unknown - High Severity   • Sulfa Antibiotics Unknown - High Severity       Current Meds:   Current Facility-Administered Medications:   •  acetaminophen (TYLENOL) tablet 650 mg, 650 mg, Oral, Q4H PRN **OR** acetaminophen (TYLENOL) suppository 650 mg, 650 mg, Rectal, Q4H PRN, Jose Alfredo Mcbride MD  •  ALPRAZolam (XANAX) tablet 0.5 mg, 0.5 mg, Oral, BID PRN, Jose Alfredo Mcbride MD  •  apixaban (ELIQUIS) tablet 5 mg, 5 mg, Oral, Q12H, Jose Alfredo Mcbride MD  •  atorvastatin (LIPITOR) tablet 20 mg, 20 mg, Oral, Nightly, Jose Alfredo Mcbride MD  •  dextrose (D50W) (25 g/50 mL) IV injection 25 g, 25 g, Intravenous, Q15 Min PRN, Jose Alfredo Mcbride MD  •  dextrose (GLUTOSE) oral gel 15 g, 15 g, Oral, Q15 Min PRN, Jose Alfredo Mcbride MD  •  dilTIAZem (CARDIZEM) 125 mg in 125 mL NS infusion, 5-15 mg/hr, Intravenous, Titrated, Jose Alfredo Mcbride MD  •  docusate sodium (COLACE) capsule 100 mg, 100 mg, Oral, BID PRN, Jose Alfredo Mcbride MD  •  flecainide (TAMBOCOR) tablet 100 mg, 100 mg, Oral, Q12H, Jose Alfredo Mcbride MD  •  glucagon (human recombinant) (GLUCAGEN DIAGNOSTIC) injection 1 mg, 1 mg, Subcutaneous, Q15 Min PRN, Jose Alfredo Mcbride MD  •  guaiFENesin (MUCINEX) 12 hr tablet 1,200 mg, 1,200 mg, Oral, Q12H, Jose Alfredo Mcbride MD  •  ipratropium-albuterol (DUO-NEB) nebulizer  "solution 3 mL, 3 mL, Nebulization, TID - RT, Jose Alfredo Mcbride MD  •  losartan (COZAAR) tablet 100 mg, 100 mg, Oral, Q24H, Jose Alfredo Mcbride MD  •  metoprolol succinate XL (TOPROL-XL) 24 hr tablet 25 mg, 25 mg, Oral, Q24H, Jose Alfredo Mcbride MD  •  metoprolol tartrate (LOPRESSOR) injection 5 mg, 5 mg, Intravenous, Q4H PRN, Jose Alfredo Mcbride MD  •  montelukast (SINGULAIR) tablet 10 mg, 10 mg, Oral, Nightly, Jose Alfredo Mcbride MD  •  nystatin (MYCOSTATIN) powder, , Topical, Q12H, Jose Alfredo Mcbride MD  •  nystatin (MYCOSTATIN) powder, , Topical, BID PRN, Raoul Arndt MD  •  ondansetron (ZOFRAN) tablet 4 mg, 4 mg, Oral, Q6H PRN **OR** ondansetron (ZOFRAN) injection 4 mg, 4 mg, Intravenous, Q6H PRN, Jose Alfredo Mcbride MD  •  pantoprazole (PROTONIX) EC tablet 40 mg, 40 mg, Oral, BID AC, Jose Alfredo Mcbride MD  •  polyethylene glycol (MIRALAX) packet 17 g, 17 g, Oral, Daily PRN, Mena Salazar APRN  •  potassium chloride (MICRO-K) CR capsule 20 mEq, 20 mEq, Oral, Daily, Jose Alfredo Mcbride MD  •  sennosides-docusate (PERICOLACE) 8.6-50 MG per tablet 2 tablet, 2 tablet, Oral, BID, Mena Salazar APRN  •  sodium chloride 0.9 % flush 10 mL, 10 mL, Intravenous, Q12H, Jose Alfredo Mcbride MD  •  sodium chloride 0.9 % flush 10 mL, 10 mL, Intravenous, PRN, Jose Alfredo Mcbride MD  •  torsemide (DEMADEX) tablet 20 mg, 20 mg, Oral, Daily, Jose Alfredo Mcbride MD  •  trolamine salicylate (ASPERCREME) 10 % cream 1 application, 1 application, Topical, PRN, Mena Salazar APRN  •  zolpidem (AMBIEN) tablet 5 mg, 5 mg, Oral, Nightly PRN, Jose Alfredo Mcbride MD    Data:     Code Status:    There are no questions and answers to display.       No family history on file.    Social History     Socioeconomic History   • Marital status:        Vitals:  Ht 162.6 cm (64\")   Wt 121 kg (267 lb 1.6 oz)   BMI 45.85 kg/m²   T 98.2 HR 76 RR 16 /51 SPO2 93% (4 " lpm)          I/O (24Hr):  No intake or output data in the 24 hours ending 01/13/23 0947    Labs and imaging:      No results found for this or any previous visit (from the past 12 hour(s)).      Physical Examination:        Physical Exam  Vitals and nursing note reviewed.   Constitutional:       Appearance: Normal appearance. She is obese.   HENT:      Head: Normocephalic and atraumatic.      Right Ear: External ear normal.      Left Ear: External ear normal.      Nose: Nose normal.      Mouth/Throat:      Mouth: Mucous membranes are moist.      Pharynx: Oropharynx is clear.   Eyes:      Extraocular Movements: Extraocular movements intact.      Conjunctiva/sclera: Conjunctivae normal.      Pupils: Pupils are equal, round, and reactive to light.   Cardiovascular:      Rate and Rhythm: Normal rate. Rhythm irregular.      Pulses: Normal pulses.      Heart sounds: Normal heart sounds.   Pulmonary:      Effort: Pulmonary effort is normal.      Breath sounds: Normal breath sounds.   Abdominal:      General: Bowel sounds are normal.      Palpations: Abdomen is soft.   Musculoskeletal:         General: Normal range of motion.      Cervical back: Normal range of motion and neck supple.      Comments: Generalized weakness   Skin:     General: Skin is warm and dry.      Capillary Refill: Capillary refill takes 2 to 3 seconds.      Findings: Erythema and rash present.   Neurological:      General: No focal deficit present.      Mental Status: She is alert and oriented to person, place, and time. Mental status is at baseline.      Motor: Weakness present.   Psychiatric:         Mood and Affect: Mood normal.         Behavior: Behavior normal.         Thought Content: Thought content normal.         Judgment: Judgment normal.           Assessment:          * No active hospital problems. *    No past medical history on file.     Plan:        1. Acute hypoxic respiratory failure  2. Acute exacerbation COPD  3. Pulmonary  Emboli  4. Chronic atrial fibrillation  5. Heart failure with preserved ejection fraction  6. Pulmonary fibrosis  7. Severe tracheomalacia  8. DVT s/p IVC filter placement  9. Leukocytosis - steroid induced?  10. Hypokalemia  11. Pulmonary hypertension     Continue current treatment. Monitor counts. Increase activity as tolerated. Labs Monday. Oxygen weaning as tolerated. Maintain patient safety. Continue diuresis. Monitor blood pressure closely. Continue bowel regimen. Discharge planning.     Electronically signed by KD Luna on 1/13/2023 at 09:47 CST     I have discussed the care of Keisha Resendiz, including pertinent history and exam findings, with the nurse practitioner.    I have seen and examined the patient and the key elements of all parts of the encounter have been performed by me.  I agree with the assessment, plan and orders as documented by KD Guillen, after I modified the exam findings and the plan of treatments and the final version is my approved version of the assessment.        Electronically signed by Jose Alfredo Mcbride MD on 1/13/2023 at 19:15 CST

## 2023-01-14 PROCEDURE — 63710000001 ONDANSETRON PER 8 MG: Performed by: INTERNAL MEDICINE

## 2023-01-14 PROCEDURE — 25010000002 ONDANSETRON PER 1 MG: Performed by: INTERNAL MEDICINE

## 2023-01-14 RX ORDER — BENZONATATE 100 MG/1
100 CAPSULE ORAL 3 TIMES DAILY PRN
Status: DISCONTINUED | OUTPATIENT
Start: 2023-01-14 | End: 2023-01-16 | Stop reason: HOSPADM

## 2023-01-14 NOTE — PROGRESS NOTES
FABBY Zendejas APRN        Internal Medicine Progress Note    1/14/2023   14:40 CST    Name:  Keisha Resendiz  MRN:    [id number]     Acct:     [id number]   Room:  [address] [place] [address] Day: 0     Admit Date: 12/29/2022  3:21 PM  PCP: Shannon Lilly PA-C    Subjective:     C/C: Oxygen weaning and rehabilitation efforts    Interval History: Status: Improved. No family at bedside. Afebrile. Sitting up in recliner. Pt complains of mild cough with minimal sputum production. Denies chest pain or shortness of breath. Anticipating going to SNF next week.     Review of Systems   Constitutional: Negative for chills, decreased appetite, fever, malaise/fatigue, weight gain and weight loss.   HENT: Negative for congestion, ear discharge, ear pain, hoarse voice, sore throat and tinnitus.    Eyes: Negative for blurred vision, discharge, pain, visual disturbance and visual halos.   Cardiovascular: Positive for dyspnea on exertion and irregular heartbeat. Negative for chest pain, claudication, leg swelling, orthopnea and paroxysmal nocturnal dyspnea.   Respiratory: Positive for shortness of breath. Negative for cough, sputum production and wheezing.    Endocrine: Negative for cold intolerance, heat intolerance, polydipsia, polyphagia and polyuria.   Hematologic/Lymphatic: Negative for adenopathy. Does not bruise/bleed easily.   Skin: Positive for itching and rash. Negative for dry skin, flushing, nail changes and suspicious lesions.   Musculoskeletal: Positive for falls and muscle weakness. Negative for arthritis, back pain, joint pain and myalgias.   Gastrointestinal: Positive for constipation. Negative for bloating, abdominal pain, diarrhea, dysphagia and hematemesis.   Genitourinary: Negative for bladder incontinence, dysuria, flank pain and frequency.   Neurological: Positive for weakness. Negative for aphonia, difficulty with concentration, disturbances in coordination, dizziness and  headaches.   Psychiatric/Behavioral: Positive for altered mental status. Negative for depression, memory loss, substance abuse, suicidal ideas and thoughts of violence. The patient does not have insomnia and is not nervous/anxious.    Allergic/Immunologic: Negative for environmental allergies, HIV exposure, hives and persistent infections.         Medications:     Allergies:   Allergies   Allergen Reactions   • Clindamycin/Lincomycin Unknown - High Severity   • Keflex [Cephalexin] Unknown - High Severity   • Penicillins Unknown - High Severity   • Sulfa Antibiotics Unknown - High Severity       Current Meds:   Current Facility-Administered Medications:   •  acetaminophen (TYLENOL) tablet 650 mg, 650 mg, Oral, Q4H PRN **OR** acetaminophen (TYLENOL) suppository 650 mg, 650 mg, Rectal, Q4H PRN, Jose Alfredo Mcbride MD  •  ALPRAZolam (XANAX) tablet 0.5 mg, 0.5 mg, Oral, BID PRN, Jose Alfredo Mcbride MD  •  apixaban (ELIQUIS) tablet 5 mg, 5 mg, Oral, Q12H, Jose Alfredo Mcbride MD  •  atorvastatin (LIPITOR) tablet 20 mg, 20 mg, Oral, Nightly, Jose Alfredo Mcbride MD  •  dextrose (D50W) (25 g/50 mL) IV injection 25 g, 25 g, Intravenous, Q15 Min PRN, Jose Alfredo Mcbride MD  •  dextrose (GLUTOSE) oral gel 15 g, 15 g, Oral, Q15 Min PRN, Jose Alfredo Mcbride MD  •  dilTIAZem (CARDIZEM) 125 mg in 125 mL NS infusion, 5-15 mg/hr, Intravenous, Titrated, Jose Alfredo Mcbride MD  •  docusate sodium (COLACE) capsule 100 mg, 100 mg, Oral, BID PRN, Jose Alfredo Mcbride MD  •  flecainide (TAMBOCOR) tablet 100 mg, 100 mg, Oral, Q12H, Jose Alfredo Mcbride MD  •  glucagon (human recombinant) (GLUCAGEN DIAGNOSTIC) injection 1 mg, 1 mg, Subcutaneous, Q15 Min PRN, Jose Alfredo Mcbride MD  •  guaiFENesin (MUCINEX) 12 hr tablet 1,200 mg, 1,200 mg, Oral, Q12H, Jose Alfredo Mcbride MD  •  ipratropium-albuterol (DUO-NEB) nebulizer solution 3 mL, 3 mL, Nebulization, TID - RT, Jose Alfredo Mcbride MD  •  losartan (COZAAR)  "tablet 100 mg, 100 mg, Oral, Q24H, Jose Alfredo Mcbride MD  •  metoprolol succinate XL (TOPROL-XL) 24 hr tablet 25 mg, 25 mg, Oral, Q24H, Jose Alfredo Mcbride MD  •  metoprolol tartrate (LOPRESSOR) injection 5 mg, 5 mg, Intravenous, Q4H PRN, Jose Alfredo Mcbride MD  •  montelukast (SINGULAIR) tablet 10 mg, 10 mg, Oral, Nightly, Jose Alfredo Mcbride MD  •  nystatin (MYCOSTATIN) powder, , Topical, Q12H, Jose Alfredo Mcbride MD  •  nystatin (MYCOSTATIN) powder, , Topical, BID PRN, Raoul Arndt MD  •  ondansetron (ZOFRAN) tablet 4 mg, 4 mg, Oral, Q6H PRN **OR** ondansetron (ZOFRAN) injection 4 mg, 4 mg, Intravenous, Q6H PRN, Jose Alfredo Mcbride MD  •  pantoprazole (PROTONIX) EC tablet 40 mg, 40 mg, Oral, BID AC, Jose Alfredo Mcbride MD  •  polyethylene glycol (MIRALAX) packet 17 g, 17 g, Oral, Daily PRN, Mena Salazar, APRN  •  potassium chloride (MICRO-K) CR capsule 20 mEq, 20 mEq, Oral, Daily, Jose Alfredo Mcbride MD  •  sennosides-docusate (PERICOLACE) 8.6-50 MG per tablet 2 tablet, 2 tablet, Oral, BID, Mena Salazar, APRN  •  sodium chloride 0.9 % flush 10 mL, 10 mL, Intravenous, Q12H, Jose Alfredo Mcbride MD  •  sodium chloride 0.9 % flush 10 mL, 10 mL, Intravenous, PRN, Jose Alfredo Mcbride MD  •  torsemide (DEMADEX) tablet 20 mg, 20 mg, Oral, Daily, Jose Alfredo Mcbride MD  •  trolamine salicylate (ASPERCREME) 10 % cream 1 application, 1 application, Topical, PRN, Mena Salazar, APRN  •  zolpidem (AMBIEN) tablet 5 mg, 5 mg, Oral, Nightly PRN, Jose Alfredo Mcbride MD    Data:     Code Status:    There are no questions and answers to display.       No family history on file.    Social History     Socioeconomic History   • Marital status:        Vitals:  Ht 162.6 cm (64\")   Wt 121 kg (267 lb 1.6 oz)   BMI 45.85 kg/m²   T 98.2 HR 74 RR 18 /42 SPO2 93% (4 lpm)          I/O (24Hr):  No intake or output data in the 24 hours ending 01/14/23 1440    Labs " and imaging:      No results found for this or any previous visit (from the past 12 hour(s)).      Physical Examination:        Physical Exam  Vitals and nursing note reviewed.   Constitutional:       Appearance: Normal appearance. She is obese.   HENT:      Head: Normocephalic and atraumatic.      Right Ear: External ear normal.      Left Ear: External ear normal.      Nose: Nose normal.      Mouth/Throat:      Mouth: Mucous membranes are moist.      Pharynx: Oropharynx is clear.   Eyes:      Extraocular Movements: Extraocular movements intact.      Conjunctiva/sclera: Conjunctivae normal.      Pupils: Pupils are equal, round, and reactive to light.   Cardiovascular:      Rate and Rhythm: Normal rate. Rhythm irregular.      Pulses: Normal pulses.      Heart sounds: Normal heart sounds.   Pulmonary:      Effort: Pulmonary effort is normal.      Breath sounds: Normal breath sounds.   Abdominal:      General: Bowel sounds are normal.      Palpations: Abdomen is soft.   Musculoskeletal:         General: Normal range of motion.      Cervical back: Normal range of motion and neck supple.      Comments: Generalized weakness   Skin:     General: Skin is warm and dry.      Capillary Refill: Capillary refill takes 2 to 3 seconds.      Findings: Erythema and rash present.   Neurological:      General: No focal deficit present.      Mental Status: She is alert and oriented to person, place, and time. Mental status is at baseline.      Motor: Weakness present.   Psychiatric:         Mood and Affect: Mood normal.         Behavior: Behavior normal.         Thought Content: Thought content normal.         Judgment: Judgment normal.           Assessment:          * No active hospital problems. *    No past medical history on file.     Plan:        1. Acute hypoxic respiratory failure  2. Acute exacerbation COPD  3. Pulmonary Emboli  4. Chronic atrial fibrillation  5. Heart failure with preserved ejection fraction  6. Pulmonary  fibrosis  7. Severe tracheomalacia  8. DVT s/p IVC filter placement  9. Leukocytosis - steroid induced?  10. Hypokalemia  11. Pulmonary hypertension     Continue current treatment. Monitor counts. Increase activity as tolerated. Labs Monday. Oxygen weaning as tolerated. Maintain patient safety. Continue diuresis. Monitor blood pressure closely. Continue bowel regimen. Discharge planning.     Electronically signed by KD Grey on 1/14/2023 at 14:40 CST     I have discussed the care of Keisha Resendiz, including pertinent history and exam findings, with the nurse practitioner.    I have seen and examined the patient and the key elements of all parts of the encounter have been performed by me.  I agree with the assessment, plan and orders as documented by KD Grajeda, after I modified the exam findings and the plan of treatments and the final version is my approved version of the assessment.        Electronically signed by Jose Alfredo Mcbride MD on 1/14/2023 at 18:30 CST

## 2023-01-15 LAB — GLUCOSE BLDC GLUCOMTR-MCNC: 111 MG/DL (ref 70–130)

## 2023-01-15 PROCEDURE — 82962 GLUCOSE BLOOD TEST: CPT

## 2023-01-15 PROCEDURE — 25010000002 ONDANSETRON PER 1 MG: Performed by: INTERNAL MEDICINE

## 2023-01-15 NOTE — PROGRESS NOTES
FABBY Zendejas APRN        Internal Medicine Progress Note    1/15/2023   09:19 CST    Name:  Keisha Resendiz  MRN:    [id number]     Acct:     [id number]   Room:  [address] [place] [address] Day: 0     Admit Date: 12/29/2022  3:21 PM  PCP: Shannon Lilly PA-C    Subjective:     C/C: Oxygen weaning and rehabilitation efforts    Interval History: Status: Improved. No family at bedside. Afebrile. Resting in bed. States cough improved with tessalon pearls. No new concerns, anxious for discharge.     Review of Systems   Constitutional: Negative for chills, decreased appetite, fever, malaise/fatigue, weight gain and weight loss.   HENT: Negative for congestion, ear discharge, ear pain, hoarse voice, sore throat and tinnitus.    Eyes: Negative for blurred vision, discharge, pain, visual disturbance and visual halos.   Cardiovascular: Positive for dyspnea on exertion and irregular heartbeat. Negative for chest pain, claudication, leg swelling, orthopnea and paroxysmal nocturnal dyspnea.   Respiratory: Positive for shortness of breath. Negative for cough, sputum production and wheezing.    Endocrine: Negative for cold intolerance, heat intolerance, polydipsia, polyphagia and polyuria.   Hematologic/Lymphatic: Negative for adenopathy. Does not bruise/bleed easily.   Skin: Positive for itching and rash. Negative for dry skin, flushing, nail changes and suspicious lesions.   Musculoskeletal: Positive for falls and muscle weakness. Negative for arthritis, back pain, joint pain and myalgias.   Gastrointestinal: Positive for constipation. Negative for bloating, abdominal pain, diarrhea, dysphagia and hematemesis.   Genitourinary: Negative for bladder incontinence, dysuria, flank pain and frequency.   Neurological: Positive for weakness. Negative for aphonia, difficulty with concentration, disturbances in coordination, dizziness and headaches.   Psychiatric/Behavioral: Positive for altered mental  status. Negative for depression, memory loss, substance abuse, suicidal ideas and thoughts of violence. The patient does not have insomnia and is not nervous/anxious.    Allergic/Immunologic: Negative for environmental allergies, HIV exposure, hives and persistent infections.         Medications:     Allergies:   Allergies   Allergen Reactions   • Clindamycin/Lincomycin Unknown - High Severity   • Keflex [Cephalexin] Unknown - High Severity   • Penicillins Unknown - High Severity   • Sulfa Antibiotics Unknown - High Severity       Current Meds:   Current Facility-Administered Medications:   •  acetaminophen (TYLENOL) tablet 650 mg, 650 mg, Oral, Q4H PRN **OR** acetaminophen (TYLENOL) suppository 650 mg, 650 mg, Rectal, Q4H PRN, Jose Alfredo Mcbride MD  •  ALPRAZolam (XANAX) tablet 0.5 mg, 0.5 mg, Oral, BID PRN, Jose Alfredo Mcbride MD  •  apixaban (ELIQUIS) tablet 5 mg, 5 mg, Oral, Q12H, Jose Alfredo Mcbride MD  •  atorvastatin (LIPITOR) tablet 20 mg, 20 mg, Oral, Nightly, Jose Alfredo Mcbride MD  •  benzonatate (TESSALON) capsule 100 mg, 100 mg, Oral, TID PRN, Jose Alfredo Mcbride MD  •  dextrose (D50W) (25 g/50 mL) IV injection 25 g, 25 g, Intravenous, Q15 Min PRN, Jose Alfredo Mcbride MD  •  dextrose (GLUTOSE) oral gel 15 g, 15 g, Oral, Q15 Min PRN, Jose Alfredo Mcbride MD  •  dilTIAZem (CARDIZEM) 125 mg in 125 mL NS infusion, 5-15 mg/hr, Intravenous, Titrated, Jose Alfredo Mcbride MD  •  docusate sodium (COLACE) capsule 100 mg, 100 mg, Oral, BID PRN, Jose Alfredo Mcbride MD  •  flecainide (TAMBOCOR) tablet 100 mg, 100 mg, Oral, Q12H, Jose Alfredo Mcbride MD  •  glucagon (human recombinant) (GLUCAGEN DIAGNOSTIC) injection 1 mg, 1 mg, Subcutaneous, Q15 Min PRN, Jose Alfredo Mcbride MD  •  guaiFENesin (MUCINEX) 12 hr tablet 1,200 mg, 1,200 mg, Oral, Q12H, Jose Alfredo Mcbride MD  •  ipratropium-albuterol (DUO-NEB) nebulizer solution 3 mL, 3 mL, Nebulization, TID - RT, Jose Alfredo Mcbride,  "MD  •  losartan (COZAAR) tablet 100 mg, 100 mg, Oral, Q24H, Jose Alfredo Mcbride MD  •  metoprolol succinate XL (TOPROL-XL) 24 hr tablet 25 mg, 25 mg, Oral, Q24H, Jose Alfredo Mcbride MD  •  metoprolol tartrate (LOPRESSOR) injection 5 mg, 5 mg, Intravenous, Q4H PRN, Jose Alfredo Mcbride MD  •  montelukast (SINGULAIR) tablet 10 mg, 10 mg, Oral, Nightly, Jose Alfredo Mcbride MD  •  nystatin (MYCOSTATIN) powder, , Topical, Q12H, Jose Alfredo Mcbride MD  •  nystatin (MYCOSTATIN) powder, , Topical, BID PRN, Raoul Arndt MD  •  ondansetron (ZOFRAN) tablet 4 mg, 4 mg, Oral, Q6H PRN **OR** ondansetron (ZOFRAN) injection 4 mg, 4 mg, Intravenous, Q6H PRN, Jose Alfredo Mcbride MD  •  pantoprazole (PROTONIX) EC tablet 40 mg, 40 mg, Oral, BID AC, Jose Alfredo Mcbride MD  •  polyethylene glycol (MIRALAX) packet 17 g, 17 g, Oral, Daily PRN, Mena Salazar, APRN  •  potassium chloride (MICRO-K) CR capsule 20 mEq, 20 mEq, Oral, Daily, Jose Alfredo Mcbride MD  •  sennosides-docusate (PERICOLACE) 8.6-50 MG per tablet 2 tablet, 2 tablet, Oral, BID, Mena Salazar, APRN  •  sodium chloride 0.9 % flush 10 mL, 10 mL, Intravenous, Q12H, Jose Alfredo Mcbride MD  •  sodium chloride 0.9 % flush 10 mL, 10 mL, Intravenous, PRN, Jose Alfredo Mcbride MD  •  torsemide (DEMADEX) tablet 20 mg, 20 mg, Oral, Daily, Jose Alfredo Mcbride MD  •  trolamine salicylate (ASPERCREME) 10 % cream 1 application, 1 application, Topical, PRN, Mena Salazar, APRN  •  zolpidem (AMBIEN) tablet 5 mg, 5 mg, Oral, Nightly PRN, Jose Alfredo Mcbride MD    Data:     Code Status:    There are no questions and answers to display.       No family history on file.    Social History     Socioeconomic History   • Marital status:        Vitals:  Ht 162.6 cm (64\")   Wt 121 kg (267 lb 1.6 oz)   BMI 45.85 kg/m²   T 97.6 HR 84 RR 16 /59 SPO2 91% (4 lpm)          I/O (24Hr):  No intake or output data in the 24 hours " ending 01/15/23 0919    Labs and imaging:      Recent Results (from the past 12 hour(s))   POC Glucose Once    Collection Time: 01/15/23  7:06 AM    Specimen: Blood   Result Value Ref Range    Glucose 111 70 - 130 mg/dL         Physical Examination:        Physical Exam  Vitals and nursing note reviewed.   Constitutional:       Appearance: Normal appearance. She is obese.   HENT:      Head: Normocephalic and atraumatic.      Right Ear: External ear normal.      Left Ear: External ear normal.      Nose: Nose normal.      Mouth/Throat:      Mouth: Mucous membranes are moist.      Pharynx: Oropharynx is clear.   Eyes:      Extraocular Movements: Extraocular movements intact.      Conjunctiva/sclera: Conjunctivae normal.      Pupils: Pupils are equal, round, and reactive to light.   Cardiovascular:      Rate and Rhythm: Normal rate. Rhythm irregular.      Pulses: Normal pulses.      Heart sounds: Normal heart sounds.   Pulmonary:      Effort: Pulmonary effort is normal.      Breath sounds: Normal breath sounds.   Abdominal:      General: Bowel sounds are normal.      Palpations: Abdomen is soft.   Musculoskeletal:         General: Normal range of motion.      Cervical back: Normal range of motion and neck supple.      Comments: Generalized weakness   Skin:     General: Skin is warm and dry.      Capillary Refill: Capillary refill takes 2 to 3 seconds.      Findings: Erythema and rash present.   Neurological:      General: No focal deficit present.      Mental Status: She is alert and oriented to person, place, and time. Mental status is at baseline.      Motor: Weakness present.   Psychiatric:         Mood and Affect: Mood normal.         Behavior: Behavior normal.         Thought Content: Thought content normal.         Judgment: Judgment normal.           Assessment:          * No active hospital problems. *    No past medical history on file.     Plan:        1. Acute hypoxic respiratory failure  2. Acute  exacerbation COPD  3. Pulmonary Emboli  4. Chronic atrial fibrillation  5. Heart failure with preserved ejection fraction  6. Pulmonary fibrosis  7. Severe tracheomalacia  8. DVT s/p IVC filter placement  9. Leukocytosis - steroid induced?  10. Hypokalemia  11. Pulmonary hypertension     Continue current treatment. Monitor counts. Increase activity as tolerated. Labs Monday. Oxygen weaning as tolerated. Maintain patient safety. Continue diuresis. Monitor blood pressure closely. Continue bowel regimen. Discharge planning.     Electronically signed by KD Grey on 1/15/2023 at 09:19 CST   I have discussed the care of Keisha Resendiz, including pertinent history and exam findings, with the nurse practitioner.    I have seen and examined the patient and the key elements of all parts of the encounter have been performed by me.  I agree with the assessment, plan and orders as documented by KD Grajeda, after I modified the exam findings and the plan of treatments and the final version is my approved version of the assessment.        Electronically signed by Jose Alfredo Mcbride MD on 1/15/2023 at 21:05 CST

## 2023-01-16 VITALS — BODY MASS INDEX: 37.08 KG/M2 | HEIGHT: 64 IN | WEIGHT: 217.2 LBS

## 2023-01-16 LAB
ANION GAP SERPL CALCULATED.3IONS-SCNC: 10 MMOL/L (ref 5–15)
BASOPHILS # BLD AUTO: 0.03 10*3/MM3 (ref 0–0.2)
BASOPHILS NFR BLD AUTO: 0.5 % (ref 0–1.5)
BUN SERPL-MCNC: 13 MG/DL (ref 8–23)
BUN/CREAT SERPL: 20.3 (ref 7–25)
CALCIUM SPEC-SCNC: 6.8 MG/DL (ref 8.6–10.5)
CHLORIDE SERPL-SCNC: 95 MMOL/L (ref 98–107)
CO2 SERPL-SCNC: 31 MMOL/L (ref 22–29)
CREAT SERPL-MCNC: 0.64 MG/DL (ref 0.57–1)
DEPRECATED RDW RBC AUTO: 53.4 FL (ref 37–54)
EGFRCR SERPLBLD CKD-EPI 2021: 91.2 ML/MIN/1.73
EOSINOPHIL # BLD AUTO: 0.15 10*3/MM3 (ref 0–0.4)
EOSINOPHIL NFR BLD AUTO: 2.5 % (ref 0.3–6.2)
ERYTHROCYTE [DISTWIDTH] IN BLOOD BY AUTOMATED COUNT: 16.4 % (ref 12.3–15.4)
GLUCOSE BLDC GLUCOMTR-MCNC: 110 MG/DL (ref 70–130)
GLUCOSE SERPL-MCNC: 102 MG/DL (ref 65–99)
HCT VFR BLD AUTO: 35.8 % (ref 34–46.6)
HGB BLD-MCNC: 11.2 G/DL (ref 12–15.9)
IMM GRANULOCYTES # BLD AUTO: 0.07 10*3/MM3 (ref 0–0.05)
IMM GRANULOCYTES NFR BLD AUTO: 1.2 % (ref 0–0.5)
LYMPHOCYTES # BLD AUTO: 1.21 10*3/MM3 (ref 0.7–3.1)
LYMPHOCYTES NFR BLD AUTO: 20.5 % (ref 19.6–45.3)
MCH RBC QN AUTO: 28.1 PG (ref 26.6–33)
MCHC RBC AUTO-ENTMCNC: 31.3 G/DL (ref 31.5–35.7)
MCV RBC AUTO: 89.9 FL (ref 79–97)
MONOCYTES # BLD AUTO: 0.51 10*3/MM3 (ref 0.1–0.9)
MONOCYTES NFR BLD AUTO: 8.6 % (ref 5–12)
NEUTROPHILS NFR BLD AUTO: 3.94 10*3/MM3 (ref 1.7–7)
NEUTROPHILS NFR BLD AUTO: 66.7 % (ref 42.7–76)
NRBC BLD AUTO-RTO: 0 /100 WBC (ref 0–0.2)
NT-PROBNP SERPL-MCNC: 502.6 PG/ML (ref 0–1800)
PLATELET # BLD AUTO: 242 10*3/MM3 (ref 140–450)
PMV BLD AUTO: 9.9 FL (ref 6–12)
POTASSIUM SERPL-SCNC: 2.9 MMOL/L (ref 3.5–5.2)
RBC # BLD AUTO: 3.98 10*6/MM3 (ref 3.77–5.28)
SARS-COV-2 AG RESP QL IA.RAPID: NORMAL
SODIUM SERPL-SCNC: 136 MMOL/L (ref 136–145)
WBC NRBC COR # BLD: 5.91 10*3/MM3 (ref 3.4–10.8)

## 2023-01-16 PROCEDURE — 83880 ASSAY OF NATRIURETIC PEPTIDE: CPT | Performed by: INTERNAL MEDICINE

## 2023-01-16 PROCEDURE — 85025 COMPLETE CBC W/AUTO DIFF WBC: CPT | Performed by: INTERNAL MEDICINE

## 2023-01-16 PROCEDURE — 80048 BASIC METABOLIC PNL TOTAL CA: CPT | Performed by: INTERNAL MEDICINE

## 2023-01-16 PROCEDURE — 63710000001 ONDANSETRON PER 8 MG: Performed by: INTERNAL MEDICINE

## 2023-01-16 PROCEDURE — 25010000002 ONDANSETRON PER 1 MG: Performed by: INTERNAL MEDICINE

## 2023-01-16 PROCEDURE — 82962 GLUCOSE BLOOD TEST: CPT

## 2023-01-16 PROCEDURE — 87426 SARSCOV CORONAVIRUS AG IA: CPT | Performed by: INTERNAL MEDICINE

## 2023-01-16 RX ORDER — POTASSIUM CHLORIDE 750 MG/1
40 CAPSULE, EXTENDED RELEASE ORAL
Status: DISCONTINUED | OUTPATIENT
Start: 2023-01-16 | End: 2023-01-16 | Stop reason: HOSPADM

## 2023-01-16 NOTE — DISCHARGE SUMMARY
FABBY Zendejas APRN      Internal Medicine Discharge Summary    Patient ID: Keisha Resendiz  MRN: [id number]     Acct:  [id number]       Patient's PCP: Shannon Lilly PA-C    Admit Date: 12/29/2022     Discharge Date:  1/16/23    Admitting Physician: Jose Alfredo Mcbride MD    Discharge Physician: KD Luna     Active Discharge Diagnoses:  Acute hypoxic respiratory failure  Acute exacerbation COPD  Pulmonary Emboli  Chronic atrial fibrillation  Heart failure with preserved ejection fraction  Pulmonary fibrosis  Severe tracheomalacia  DVT s/p IVC filter placement  Leukocytosis - steroid induced?  Hypokalemia  Pulmonary hypertension    Hospital Problems    * No active hospital problems. *   No past medical history on file.    The patient was seen and examined on the day of discharge and this discharge summary is in conjunction with any daily progress note from day of discharge.    Code Status:    There are no questions and answers to display.       Hospital Course: The patient had been in her usual state of health when she developed increased shortness of breath with fever, chills and chest tightness. She was evaluated by home health care and found to have 02 sats in the 60s. When her symptoms persisted, she presented to ER with her complaints on 12/14. COVID negative, CXR revealed evidence of probable interstitial and alveolar edema and echo revealed EF 55%. Cultures were obtained and the patient was treated with IV antibiotics and diuretics. She was admitted to the hospital and underwent bronchoscopy that revealed severe tracheomalacia and bronchial washings grew 3+ candida. Due to drug-drug interaction with patient's flecainide, Eraxis was chosen over fluconazole. Further workup revealed numerous small pulmonary emboli and patient was treated with lovenox. Of note, patient has a history of DVT with prior IVC filter placement. She completed  antibiotics and antifungal treatment on 12/25. She continued with increased oxygen demand with up to 5 lpm at rest and up to 10 lpm with activity to maintain adequate 02 sats. She transferred to our facility for continued oxygen weaning and rehabilitation efforts.   Upon transfer, she was evaluated by pulmonology for continued oxygen weaning efforts. She required cardizem drip for atrial fibrillation with RVR and heart rates normalized. She had issues with electrolyte derangements necessitating replacement with potassium. She had issues with abdominal pain and nausea secondary to constipation. This has improved with an aggressive bowel regimen. She has tolerated oxygen weaning to 02 at 3-4 lpm. She has progressed with therapies, but does have increased oxygen demand with activity as well as limited reserve impeding further progress. At this time, she is felt stable for discharge to SNF for continued rehabilitation efforts and oxygen weaning at a lower level of care. Please note, potassium 2.9 this morning and 40 meq potassium administered prior to discharge. Would recommend repeat potassium/bmp with magnesium at receiving facility.     Consults:  Dr. Batista (pulmonology)    Disposition: SNF     Physical Exam  Vitals and nursing note reviewed.   Constitutional:       Appearance: Normal appearance. She is obese.   HENT:      Head: Normocephalic and atraumatic.      Right Ear: External ear normal.      Left Ear: External ear normal.      Nose: Nose normal.      Mouth/Throat:      Mouth: Mucous membranes are moist.      Pharynx: Oropharynx is clear.   Eyes:      Extraocular Movements: Extraocular movements intact.      Conjunctiva/sclera: Conjunctivae normal.      Pupils: Pupils are equal, round, and reactive to light.   Cardiovascular:      Rate and Rhythm: Normal rate. Rhythm irregular.      Pulses: Normal pulses.      Heart sounds: Normal heart sounds.   Pulmonary:      Effort: Pulmonary effort is normal.      Breath  sounds: Normal breath sounds.   Abdominal:      General: Bowel sounds are normal.      Palpations: Abdomen is soft.   Musculoskeletal:         General: Normal range of motion.      Cervical back: Normal range of motion and neck supple.      Comments: Generalized weakness   Skin:     General: Skin is warm and dry.      Capillary Refill: Capillary refill takes 2 to 3 seconds.      Findings: Erythema and rash present.   Neurological:      General: No focal deficit present.      Mental Status: She is alert and oriented to person, place, and time. Mental status is at baseline.      Motor: Weakness present.   Psychiatric:         Mood and Affect: Mood normal.         Behavior: Behavior normal.         Thought Content: Thought content normal.         Judgment: Judgment normal.     Discharged Condition: Stable    Follow Up: PCP 1 week after discharge  BMP in  am    Diet: Diet: Regular/House Diet; Texture: Regular Texture (IDDSI 7); Fluid Consistency: Thin (IDDSI 0)    Discharge Medications:   See computer generated medication reconciliation form    Time Spent on discharge is  32 minutes in patient examination, evaluation, patient/family counseling as well as medication reconciliation, prescriptions for required medications, discharge plan and follow up.     Electronically signed by KD Luna on 1/16/2023 at 08:42 CST     I have discussed the care of Keisha Resendiz, including pertinent history and exam findings, with the nurse practitioner.    I have seen and examined the patient and the key elements of all parts of the encounter have been performed by me.  I agree with the assessment, plan and orders as documented by KD Guillen, after I modified the exam findings and the plan of treatments and the final version is my approved version of the assessment.        Electronically signed by Jose Alfredo Mcbride MD on 1/16/2023 at 14:48 CST